# Patient Record
Sex: MALE | Race: BLACK OR AFRICAN AMERICAN | Employment: UNEMPLOYED | URBAN - METROPOLITAN AREA
[De-identification: names, ages, dates, MRNs, and addresses within clinical notes are randomized per-mention and may not be internally consistent; named-entity substitution may affect disease eponyms.]

---

## 2017-09-22 ENCOUNTER — APPOINTMENT (OUTPATIENT)
Dept: GENERAL RADIOLOGY | Age: 27
End: 2017-09-22
Attending: EMERGENCY MEDICINE
Payer: SELF-PAY

## 2017-09-22 ENCOUNTER — HOSPITAL ENCOUNTER (EMERGENCY)
Age: 27
Discharge: HOME | End: 2017-09-22
Attending: EMERGENCY MEDICINE
Payer: SELF-PAY

## 2017-09-22 VITALS
DIASTOLIC BLOOD PRESSURE: 64 MMHG | HEART RATE: 76 BPM | RESPIRATION RATE: 21 BRPM | BODY MASS INDEX: 44.1 KG/M2 | OXYGEN SATURATION: 94 % | SYSTOLIC BLOOD PRESSURE: 117 MMHG | WEIGHT: 315 LBS | TEMPERATURE: 97.8 F | HEIGHT: 71 IN

## 2017-09-22 DIAGNOSIS — R07.89 ATYPICAL CHEST PAIN: Primary | ICD-10-CM

## 2017-09-22 LAB
ALBUMIN SERPL-MCNC: 3.1 G/DL (ref 3.5–5)
ALBUMIN/GLOB SERPL: 0.7 {RATIO} (ref 1.1–2.2)
ALP SERPL-CCNC: 74 U/L (ref 45–117)
ALT SERPL-CCNC: 57 U/L (ref 12–78)
ANION GAP SERPL CALC-SCNC: 6 MMOL/L (ref 5–15)
AST SERPL-CCNC: 36 U/L (ref 15–37)
ATRIAL RATE: 75 BPM
BASOPHILS # BLD: 0 K/UL (ref 0–0.1)
BASOPHILS NFR BLD: 0 % (ref 0–1)
BILIRUB SERPL-MCNC: 0.1 MG/DL (ref 0.2–1)
BUN SERPL-MCNC: 13 MG/DL (ref 6–20)
BUN/CREAT SERPL: 15 (ref 12–20)
CALCIUM SERPL-MCNC: 8.3 MG/DL (ref 8.5–10.1)
CALCULATED P AXIS, ECG09: 21 DEGREES
CALCULATED R AXIS, ECG10: 50 DEGREES
CALCULATED T AXIS, ECG11: 34 DEGREES
CHLORIDE SERPL-SCNC: 104 MMOL/L (ref 97–108)
CK MB CFR SERPL CALC: 0.4 % (ref 0–2.5)
CK MB SERPL-MCNC: 4.2 NG/ML (ref 5–25)
CK SERPL-CCNC: 943 U/L (ref 39–308)
CO2 SERPL-SCNC: 27 MMOL/L (ref 21–32)
CREAT SERPL-MCNC: 0.84 MG/DL (ref 0.7–1.3)
DIAGNOSIS, 93000: NORMAL
EOSINOPHIL # BLD: 0.2 K/UL (ref 0–0.4)
EOSINOPHIL NFR BLD: 5 % (ref 0–7)
ERYTHROCYTE [DISTWIDTH] IN BLOOD BY AUTOMATED COUNT: 14 % (ref 11.5–14.5)
GLOBULIN SER CALC-MCNC: 4.6 G/DL (ref 2–4)
GLUCOSE SERPL-MCNC: 102 MG/DL (ref 65–100)
HCT VFR BLD AUTO: 38 % (ref 36.6–50.3)
HGB BLD-MCNC: 12.2 G/DL (ref 12.1–17)
LYMPHOCYTES # BLD: 1.5 K/UL (ref 0.8–3.5)
LYMPHOCYTES NFR BLD: 30 % (ref 12–49)
MCH RBC QN AUTO: 27.2 PG (ref 26–34)
MCHC RBC AUTO-ENTMCNC: 32.1 G/DL (ref 30–36.5)
MCV RBC AUTO: 84.8 FL (ref 80–99)
MONOCYTES # BLD: 0.6 K/UL (ref 0–1)
MONOCYTES NFR BLD: 11 % (ref 5–13)
NEUTS SEG # BLD: 2.8 K/UL (ref 1.8–8)
NEUTS SEG NFR BLD: 54 % (ref 32–75)
P-R INTERVAL, ECG05: 182 MS
PLATELET # BLD AUTO: 288 K/UL (ref 150–400)
POTASSIUM SERPL-SCNC: 3.7 MMOL/L (ref 3.5–5.1)
PROT SERPL-MCNC: 7.7 G/DL (ref 6.4–8.2)
Q-T INTERVAL, ECG07: 384 MS
QRS DURATION, ECG06: 84 MS
QTC CALCULATION (BEZET), ECG08: 428 MS
RBC # BLD AUTO: 4.48 M/UL (ref 4.1–5.7)
SODIUM SERPL-SCNC: 137 MMOL/L (ref 136–145)
TROPONIN I SERPL-MCNC: <0.04 NG/ML
VENTRICULAR RATE, ECG03: 75 BPM
WBC # BLD AUTO: 5.1 K/UL (ref 4.1–11.1)

## 2017-09-22 PROCEDURE — 80053 COMPREHEN METABOLIC PANEL: CPT | Performed by: EMERGENCY MEDICINE

## 2017-09-22 PROCEDURE — 93005 ELECTROCARDIOGRAM TRACING: CPT

## 2017-09-22 PROCEDURE — 71020 XR CHEST PA LAT: CPT

## 2017-09-22 PROCEDURE — 82553 CREATINE MB FRACTION: CPT | Performed by: EMERGENCY MEDICINE

## 2017-09-22 PROCEDURE — 99285 EMERGENCY DEPT VISIT HI MDM: CPT

## 2017-09-22 PROCEDURE — 36415 COLL VENOUS BLD VENIPUNCTURE: CPT | Performed by: EMERGENCY MEDICINE

## 2017-09-22 PROCEDURE — 82550 ASSAY OF CK (CPK): CPT | Performed by: EMERGENCY MEDICINE

## 2017-09-22 PROCEDURE — 85025 COMPLETE CBC W/AUTO DIFF WBC: CPT | Performed by: EMERGENCY MEDICINE

## 2017-09-22 PROCEDURE — 84484 ASSAY OF TROPONIN QUANT: CPT | Performed by: EMERGENCY MEDICINE

## 2017-09-22 NOTE — ED PROVIDER NOTES
HPI Comments: Ivon Dotson is a 28 y.o. male who presents ambulatory to the ED with c/o intermittent sharp CP that began at 4:00pm on 9/21/17 that made it difficult to sleep. He reports associated symptoms of SOB and pain in left arm. Pt states that the pain lasts for a few minutes and experienced some sweating; he has had 3 episodes since 4:00pm.  Pt has no hx of blood clots, recent surgery, or recent travel. He denies CP or SOB over the last several weeks. He denies N/V/D, abd pain, cough or hemoptysis. Pt has PShx to left arm and states he sometimes gets pain in this arm, so he is unsure if pain today was related to chest pain or due to prior surgery. He denies family hx of MI.    PCP: None    Social history significant for: - Tobacco, + (occasional) EtOH, + Illicit Drug Use (marijuana)    There are no other complaints, changes, or physical findings at this time. The history is provided by the patient. No  was used. History reviewed. No pertinent past medical history. History reviewed. No pertinent surgical history. History reviewed. No pertinent family history. Social History     Social History    Marital status: SINGLE     Spouse name: N/A    Number of children: N/A    Years of education: N/A     Occupational History    Not on file. Social History Main Topics    Smoking status: Never Smoker    Smokeless tobacco: Never Used    Alcohol use Yes      Comment: weekends    Drug use: Yes     Special: Marijuana    Sexual activity: Not on file     Other Topics Concern    Not on file     Social History Narrative    No narrative on file         ALLERGIES: Review of patient's allergies indicates no known allergies. Review of Systems   Constitutional: Positive for diaphoresis (mild). Negative for chills, fatigue and fever. HENT: Negative for congestion, rhinorrhea and sore throat. Eyes: Negative for pain, discharge and visual disturbance.    Respiratory: Positive for shortness of breath. Negative for cough, chest tightness and wheezing. Negative for hemoptysis   Cardiovascular: Positive for chest pain. Negative for palpitations and leg swelling. Gastrointestinal: Negative for abdominal pain, constipation, diarrhea, nausea and vomiting. Genitourinary: Negative for dysuria, frequency and hematuria. Musculoskeletal: Positive for myalgias (left arm). Negative for arthralgias and back pain. Skin: Negative for rash. Neurological: Negative for dizziness, weakness, light-headedness and headaches. Psychiatric/Behavioral: Negative. Patient Vitals for the past 12 hrs:   Temp Pulse Resp BP SpO2   09/22/17 0445 - 76 21 117/64 94 %   09/22/17 0400 - 83 20 126/68 97 %   09/22/17 0331 - 86 18 - 98 %   09/22/17 0330 - - - 134/61 -   09/22/17 0300 - 76 20 136/71 100 %   09/22/17 0245 - 73 16 131/75 99 %   09/22/17 0237 97.8 °F (36.6 °C) 76 16 129/70 97 %     Physical Exam   Constitutional: He is oriented to person, place, and time. He appears well-developed and well-nourished. No distress. Obese   HENT:   Head: Normocephalic and atraumatic. Eyes: EOM are normal. Right eye exhibits no discharge. Left eye exhibits no discharge. No scleral icterus. Neck: Normal range of motion. Neck supple. No tracheal deviation present. Cardiovascular: Normal rate, regular rhythm, normal heart sounds and intact distal pulses. Exam reveals no gallop and no friction rub. No murmur heard. Pulmonary/Chest: Effort normal and breath sounds normal. No respiratory distress. He has no wheezes. He has no rales. Abdominal: Soft. He exhibits no distension. There is no tenderness. Musculoskeletal: Normal range of motion. He exhibits no edema. Lymphadenopathy:     He has no cervical adenopathy. Neurological: He is alert and oriented to person, place, and time. Skin: Skin is warm and dry. No rash noted. Psychiatric: He has a normal mood and affect.    Nursing note and vitals reviewed. MDM  Number of Diagnoses or Management Options  Atypical chest pain:   Diagnosis management comments:     Differential includes atypical chest pain, stable angina, unstable angina, MI, pleurisy, costochondritis, pneumonia, bronchitis, MSK pain. Do not suspect dissection or PE (PERC negative). - CBC, CMP  - Roger, one set given duration of pain  - CXR         Amount and/or Complexity of Data Reviewed  Clinical lab tests: ordered and reviewed  Tests in the radiology section of CPT®: ordered and reviewed  Tests in the medicine section of CPT®: ordered and reviewed  Review and summarize past medical records: yes  Independent visualization of images, tracings, or specimens: yes    Patient Progress  Patient progress: stable    ED Course       Procedures     EKG interpretation: (Preliminary) 2:30 AM  Rhythm: normal sinus rhythm; and regular . Rate (approx.): 75; Axis: normal; IN interval: normal; QRS interval: normal ; ST/T wave: normal; Other findings: none. Written by Mortimer Pro, ED Scribe, as dictated by Lourdes Willis MD    Progress Note:  4:54 AM  Pt sleeping on reevaluation. He denies CP. Troponin negative. Heart score 1 so will discharge with cardiology follow up. Discussed diagnosis, follow up, return instructions, test results, x-rays and medications with the patient and/or family. The patient and/or family have been given the opportunity to ask questions. The patient and/or family express understanding of the plan of care, follow-up appointments and return instructions. The patient and/or family agree to follow up with cardiology as directed and to return immediately if the chest pain worsens.   The patient and family express understanding that although cardiac testing at this time is negative, a cardiac problem could still be present and that a follow-up appointment with a cardiologist for further evaluation and risk factor modification is necessary to complete the evaluation of this complaint. Provided customary return to ED instructions. Jayme Cochran MD    LABORATORY TESTS:  Recent Results (from the past 12 hour(s))   EKG, 12 LEAD, INITIAL    Collection Time: 09/22/17  2:30 AM   Result Value Ref Range    Ventricular Rate 75 BPM    Atrial Rate 75 BPM    P-R Interval 182 ms    QRS Duration 84 ms    Q-T Interval 384 ms    QTC Calculation (Bezet) 428 ms    Calculated P Axis 21 degrees    Calculated R Axis 50 degrees    Calculated T Axis 34 degrees    Diagnosis       Normal sinus rhythm  Normal ECG  No previous ECGs available     CBC WITH AUTOMATED DIFF    Collection Time: 09/22/17  3:04 AM   Result Value Ref Range    WBC 5.1 4.1 - 11.1 K/uL    RBC 4.48 4.10 - 5.70 M/uL    HGB 12.2 12.1 - 17.0 g/dL    HCT 38.0 36.6 - 50.3 %    MCV 84.8 80.0 - 99.0 FL    MCH 27.2 26.0 - 34.0 PG    MCHC 32.1 30.0 - 36.5 g/dL    RDW 14.0 11.5 - 14.5 %    PLATELET 280 784 - 807 K/uL    NEUTROPHILS 54 32 - 75 %    LYMPHOCYTES 30 12 - 49 %    MONOCYTES 11 5 - 13 %    EOSINOPHILS 5 0 - 7 %    BASOPHILS 0 0 - 1 %    ABS. NEUTROPHILS 2.8 1.8 - 8.0 K/UL    ABS. LYMPHOCYTES 1.5 0.8 - 3.5 K/UL    ABS. MONOCYTES 0.6 0.0 - 1.0 K/UL    ABS. EOSINOPHILS 0.2 0.0 - 0.4 K/UL    ABS. BASOPHILS 0.0 0.0 - 0.1 K/UL   METABOLIC PANEL, COMPREHENSIVE    Collection Time: 09/22/17  3:04 AM   Result Value Ref Range    Sodium 137 136 - 145 mmol/L    Potassium 3.7 3.5 - 5.1 mmol/L    Chloride 104 97 - 108 mmol/L    CO2 27 21 - 32 mmol/L    Anion gap 6 5 - 15 mmol/L    Glucose 102 (H) 65 - 100 mg/dL    BUN 13 6 - 20 MG/DL    Creatinine 0.84 0.70 - 1.30 MG/DL    BUN/Creatinine ratio 15 12 - 20      GFR est AA >60 >60 ml/min/1.73m2    GFR est non-AA >60 >60 ml/min/1.73m2    Calcium 8.3 (L) 8.5 - 10.1 MG/DL    Bilirubin, total 0.1 (L) 0.2 - 1.0 MG/DL    ALT (SGPT) 57 12 - 78 U/L    AST (SGOT) 36 15 - 37 U/L    Alk.  phosphatase 74 45 - 117 U/L    Protein, total 7.7 6.4 - 8.2 g/dL    Albumin 3.1 (L) 3.5 - 5.0 g/dL Globulin 4.6 (H) 2.0 - 4.0 g/dL    A-G Ratio 0.7 (L) 1.1 - 2.2     CK W/ REFLX CKMB    Collection Time: 09/22/17  3:04 AM   Result Value Ref Range     (H) 39 - 308 U/L   TROPONIN I    Collection Time: 09/22/17  3:04 AM   Result Value Ref Range    Troponin-I, Qt. <0.04 <0.05 ng/mL   CK-MB,QUANT. Collection Time: 09/22/17  3:04 AM   Result Value Ref Range    CK - MB 4.2 (H) <3.6 NG/ML    CK-MB Index 0.4 0 - 2.5         IMAGING RESULTS:  Study Result      INDICATION: Chest pain and shortness of breath     FINDINGS: PA and lateral views of the chest demonstrate a normal  cardiomediastinal silhouette and clear lungs bilaterally. The visualized osseous  structures are unremarkable.     IMPRESSION  IMPRESSION: No acute process. MEDICATIONS GIVEN:  Medications - No data to display    IMPRESSION:  1. Atypical chest pain        PLAN:  1. There are no discharge medications for this patient. 2.   Follow-up Information     Follow up With Details Comments [address], [place] In 3 days Please follow up with your primary care provider and cardiology [address] [facility]  [address] [address] [phone]      [facility] EMERGENCY DEPT  As needed, If symptoms worsen [address] [place] [address]  [phone]        Return to ED if worse     Discharge Note:  4:49 AM  The pt is ready for discharge. The pt's signs, symptoms, diagnosis, and discharge instructions have been discussed and pt has conveyed their understanding. The pt is to follow up as recommended or return to ER should their symptoms worsen. Plan has been discussed and pt is in agreement. This note is prepared by Jacques Anderson, acting as a Scribe for Nancy Nj MD.    Nancy Nj MD: The scribe's documentation has been prepared under my direction and personally reviewed by me in its entirety.  I confirm that the notes above accurately reflects all work, treatment, procedures, and medical decision making performed by me.

## 2017-09-22 NOTE — ED NOTES
Pt to ED for chest pain which pt states started while he was walking around and talking around 1600 yesterday, pt denies N/V/D, fever, SOB, pt denies any pertinent medical hx, but reports smoking marijuana, pt placed on cardiac monitoring x2, call bell within reach, visitor at bedside.

## 2017-09-22 NOTE — DISCHARGE INSTRUCTIONS
Chest Pain: Care Instructions  Your Care Instructions  There are many things that can cause chest pain. Some are not serious and will get better on their own in a few days. But some kinds of chest pain need more testing and treatment. Your doctor may have recommended a follow-up visit in the next 8 to 12 hours. If you are not getting better, you may need more tests or treatment. Even though your doctor has released you, you still need to watch for any problems. The doctor carefully checked you, but sometimes problems can develop later. If you have new symptoms or if your symptoms do not get better, get medical care right away. If you have worse or different chest pain or pressure that lasts more than 5 minutes or you passed out (lost consciousness), call 911 or seek other emergency help right away. A medical visit is only one step in your treatment. Even if you feel better, you still need to do what your doctor recommends, such as going to all suggested follow-up appointments and taking medicines exactly as directed. This will help you recover and help prevent future problems. How can you care for yourself at home? · Rest until you feel better. · Take your medicine exactly as prescribed. Call your doctor if you think you are having a problem with your medicine. · Do not drive after taking a prescription pain medicine. When should you call for help? Call 911 if:  · You passed out (lost consciousness). · You have severe difficulty breathing. · You have symptoms of a heart attack. These may include:  ¨ Chest pain or pressure, or a strange feeling in your chest.  ¨ Sweating. ¨ Shortness of breath. ¨ Nausea or vomiting. ¨ Pain, pressure, or a strange feeling in your back, neck, jaw, or upper belly or in one or both shoulders or arms. ¨ Lightheadedness or sudden weakness. ¨ A fast or irregular heartbeat.   After you call 911, the  may tell you to chew 1 adult-strength or 2 to 4 low-dose aspirin. Wait for an ambulance. Do not try to drive yourself. Call your doctor today if:  · You have any trouble breathing. · Your chest pain gets worse. · You are dizzy or lightheaded, or you feel like you may faint. · You are not getting better as expected. · You are having new or different chest pain. Where can you learn more? Go to http://lucas-max.info/. Enter A120 in the search box to learn more about \"Chest Pain: Care Instructions. \"  Current as of: March 20, 2017  Content Version: 11.3  © 7286-2294 [employer]. Care instructions adapted under license by [employer] (which disclaims liability or warranty for this information). If you have questions about a medical condition or this instruction, always ask your healthcare professional. [address] any warranty or liability for your use of this information.

## 2021-11-24 ENCOUNTER — APPOINTMENT (OUTPATIENT)
Dept: GENERAL RADIOLOGY | Age: 31
End: 2021-11-24
Attending: EMERGENCY MEDICINE

## 2021-11-24 ENCOUNTER — HOSPITAL ENCOUNTER (EMERGENCY)
Age: 31
Discharge: HOME | End: 2021-11-25
Attending: EMERGENCY MEDICINE

## 2021-11-24 DIAGNOSIS — R06.00 DYSPNEA, UNSPECIFIED TYPE: Primary | ICD-10-CM

## 2021-11-24 LAB
ALBUMIN SERPL-MCNC: 3.2 G/DL (ref 3.5–5)
ALBUMIN/GLOB SERPL: 0.7 {RATIO} (ref 1.1–2.2)
ALP SERPL-CCNC: 91 U/L (ref 45–117)
ALT SERPL-CCNC: 74 U/L (ref 12–78)
ANION GAP SERPL CALC-SCNC: 7 MMOL/L (ref 5–15)
AST SERPL-CCNC: 36 U/L (ref 15–37)
BASOPHILS # BLD: 0.1 K/UL (ref 0–0.1)
BASOPHILS NFR BLD: 1 % (ref 0–1)
BILIRUB SERPL-MCNC: 0.2 MG/DL (ref 0.2–1)
BNP SERPL-MCNC: 52 PG/ML
BUN SERPL-MCNC: 19 MG/DL (ref 6–20)
BUN/CREAT SERPL: 18 (ref 12–20)
CALCIUM SERPL-MCNC: 8.7 MG/DL (ref 8.5–10.1)
CHLORIDE SERPL-SCNC: 105 MMOL/L (ref 97–108)
CK MB CFR SERPL CALC: 0.6 % (ref 0–2.5)
CK MB SERPL-MCNC: 2.3 NG/ML (ref 5–25)
CK SERPL-CCNC: 412 U/L (ref 39–308)
CO2 SERPL-SCNC: 25 MMOL/L (ref 21–32)
CREAT SERPL-MCNC: 1.03 MG/DL (ref 0.7–1.3)
DIFFERENTIAL METHOD BLD: ABNORMAL
EOSINOPHIL # BLD: 0.1 K/UL (ref 0–0.4)
EOSINOPHIL NFR BLD: 2 % (ref 0–7)
ERYTHROCYTE [DISTWIDTH] IN BLOOD BY AUTOMATED COUNT: 14.6 % (ref 11.5–14.5)
GLOBULIN SER CALC-MCNC: 4.4 G/DL (ref 2–4)
GLUCOSE SERPL-MCNC: 113 MG/DL (ref 65–100)
HCT VFR BLD AUTO: 40.7 % (ref 36.6–50.3)
HGB BLD-MCNC: 13 G/DL (ref 12.1–17)
IMM GRANULOCYTES # BLD AUTO: 0 K/UL (ref 0–0.04)
IMM GRANULOCYTES NFR BLD AUTO: 0 % (ref 0–0.5)
LYMPHOCYTES # BLD: 2 K/UL (ref 0.8–3.5)
LYMPHOCYTES NFR BLD: 30 % (ref 12–49)
MCH RBC QN AUTO: 27.3 PG (ref 26–34)
MCHC RBC AUTO-ENTMCNC: 31.9 G/DL (ref 30–36.5)
MCV RBC AUTO: 85.5 FL (ref 80–99)
MONOCYTES # BLD: 0.6 K/UL (ref 0–1)
MONOCYTES NFR BLD: 9 % (ref 5–13)
NEUTS SEG # BLD: 3.8 K/UL (ref 1.8–8)
NEUTS SEG NFR BLD: 58 % (ref 32–75)
NRBC # BLD: 0 K/UL (ref 0–0.01)
NRBC BLD-RTO: 0 PER 100 WBC
PLATELET # BLD AUTO: 251 K/UL (ref 150–400)
PMV BLD AUTO: 8.6 FL (ref 8.9–12.9)
POTASSIUM SERPL-SCNC: 3.5 MMOL/L (ref 3.5–5.1)
PROT SERPL-MCNC: 7.6 G/DL (ref 6.4–8.2)
RBC # BLD AUTO: 4.76 M/UL (ref 4.1–5.7)
SODIUM SERPL-SCNC: 137 MMOL/L (ref 136–145)
TROPONIN-HIGH SENSITIVITY: 9 NG/L (ref 0–76)
WBC # BLD AUTO: 6.5 K/UL (ref 4.1–11.1)

## 2021-11-24 PROCEDURE — 83880 ASSAY OF NATRIURETIC PEPTIDE: CPT

## 2021-11-24 PROCEDURE — 80053 COMPREHEN METABOLIC PANEL: CPT

## 2021-11-24 PROCEDURE — 36415 COLL VENOUS BLD VENIPUNCTURE: CPT

## 2021-11-24 PROCEDURE — 93005 ELECTROCARDIOGRAM TRACING: CPT

## 2021-11-24 PROCEDURE — 84484 ASSAY OF TROPONIN QUANT: CPT

## 2021-11-24 PROCEDURE — 99284 EMERGENCY DEPT VISIT MOD MDM: CPT

## 2021-11-24 PROCEDURE — 85379 FIBRIN DEGRADATION QUANT: CPT

## 2021-11-24 PROCEDURE — 82550 ASSAY OF CK (CPK): CPT

## 2021-11-24 PROCEDURE — 71046 X-RAY EXAM CHEST 2 VIEWS: CPT

## 2021-11-24 PROCEDURE — 82553 CREATINE MB FRACTION: CPT

## 2021-11-24 PROCEDURE — 85025 COMPLETE CBC W/AUTO DIFF WBC: CPT

## 2021-11-25 ENCOUNTER — APPOINTMENT (OUTPATIENT)
Dept: CT IMAGING | Age: 31
End: 2021-11-25
Attending: EMERGENCY MEDICINE

## 2021-11-25 VITALS
TEMPERATURE: 98.2 F | BODY MASS INDEX: 44.1 KG/M2 | SYSTOLIC BLOOD PRESSURE: 130 MMHG | HEART RATE: 70 BPM | WEIGHT: 315 LBS | OXYGEN SATURATION: 99 % | HEIGHT: 71 IN | DIASTOLIC BLOOD PRESSURE: 81 MMHG | RESPIRATION RATE: 14 BRPM

## 2021-11-25 LAB
ATRIAL RATE: 73 BPM
CALCULATED P AXIS, ECG09: 57 DEGREES
CALCULATED R AXIS, ECG10: 56 DEGREES
CALCULATED T AXIS, ECG11: 56 DEGREES
D DIMER PPP FEU-MCNC: 0.99 MG/L FEU (ref 0–0.65)
DIAGNOSIS, 93000: NORMAL
P-R INTERVAL, ECG05: 180 MS
Q-T INTERVAL, ECG07: 396 MS
QRS DURATION, ECG06: 88 MS
QTC CALCULATION (BEZET), ECG08: 436 MS
VENTRICULAR RATE, ECG03: 73 BPM

## 2021-11-25 PROCEDURE — U0005 INFEC AGEN DETEC AMPLI PROBE: HCPCS

## 2021-11-25 PROCEDURE — 74011000636 HC RX REV CODE- 636: Performed by: EMERGENCY MEDICINE

## 2021-11-25 PROCEDURE — 71275 CT ANGIOGRAPHY CHEST: CPT

## 2021-11-25 RX ORDER — ALBUTEROL SULFATE 90 UG/1
2 AEROSOL, METERED RESPIRATORY (INHALATION)
Qty: 1 EACH | Refills: 0 | Status: SHIPPED | OUTPATIENT
Start: 2021-11-25

## 2021-11-25 RX ADMIN — IOPAMIDOL 100 ML: 755 INJECTION, SOLUTION INTRAVENOUS at 01:10

## 2021-11-25 NOTE — ED PROVIDER NOTES
EMERGENCY DEPARTMENT HISTORY AND PHYSICAL EXAM      Date: 11/24/2021  Patient Name: Alexandre Howell. History of Presenting Illness     Chief Complaint   Patient presents with    Shortness of Breath     pt presents w/ c/o increased SOB. pt reports being seen at [place] and was told he had fluid in his lungs, pt was started on laxis. History Provided By: Patient    HPI: Alexandre Howell., 27 y.o. male with PMHx as noted below presents the emergency department chief complaint of shortness of breath. Patient reports that he developed symptoms approximately 2 to 3 days ago. He reports some mild dyspnea that is worse with exertion. Also reports having intermittent chest pain 2 days ago however notes this has since resolved. Patient reports symptoms are relatively mild. States that he was seen at [facility] emergency department yesterday and told that he had fluid in his lungs and started on Lasix however notes that he is still had dyspnea today prompting him to come to our emergency department for additional evaluation. Denying any chest pain today and also is not experiencing any systemic symptoms. Pt denies any other alleviating or exacerbating factors. Additionally, pt specifically denies any recent fever, chills, headache, nausea, vomiting, abdominal pain,  lightheadedness, dizziness, numbness, weakness, BLE swelling, heart palpitations, urinary sxs, diarrhea, constipation, melena, hematochezia, cough, or congestion. PCP: None        Past History     Past Medical History:  History reviewed, no pertinent past medical history  Past Surgical History:  No past surgical history on file. Family History:  No family history on file.     Social History:  Social History     Tobacco Use    Smoking status: Never Smoker    Smokeless tobacco: Never Used   Substance Use Topics    Alcohol use: Yes     Comment: weekends    Drug use: Yes     Types: Marijuana       Allergies:  No Known Allergies      Review of Systems   Review of Systems  Constitutional: Negative for fever, chills, and fatigue. HENT: Negative for congestion, sore throat, rhinorrhea, sneezing and neck stiffness   Eyes: Negative for discharge and redness. Respiratory: Positive for  shortness of breath. Negative wheezing   Cardiovascular: Positive for chest pain. Negative palpitations   Gastrointestinal: Negative for nausea, vomiting, abdominal pain, constipation, diarrhea and blood in stool. Genitourinary: Negative for dysuria, hematuria, flank pain, decreased urine volume, discharge,   Musculoskeletal: Negative for myalgias or joint pain . Skin: Negative for rash or lesions . Neurological: Negative weakness, light-headedness, numbness and headaches. Physical Exam   Physical Exam    GENERAL: alert and oriented, no acute distress  EYES: PEERL, No injection, discharge or icterus. ENT: Mucous membranes pink and moist.  NECK: Supple  LUNGS: Airway patent. Non-labored respirations. Breath sounds clear with good air entry bilaterally. HEART: Regular rate and rhythm. No peripheral edema  ABDOMEN: Non-distended and non-tender, without guarding or rebound. SKIN:  warm, dry  MSK/EXTREMITIES: Without swelling, tenderness or deformity, symmetric with normal ROM  NEUROLOGICAL: Alert, oriented      Diagnostic Study Results     Labs -       Reviewed and interpreted by me    Radiologic Studies -   CTA CHEST W OR W WO CONT   Final Result   Bibasal atelectasis, no pulmonary emboli. XR CHEST PA LAT   Final Result   No acute changes. CT Results  (Last 48 hours)               11/25/21 0110  CTA CHEST W OR W WO CONT Final result    Impression:  Bibasal atelectasis, no pulmonary emboli. Narrative:  Clinical indication: Shortness of breath and chest pain. Localizer obtained without contrast at the left ovary arteries. Fast injection   rate of 100 cc of Isovue with review of the raw data and MIP reconstructions. No   prior.  CT dose reduction was achieved through the use of a standardized protocol   tailored for this examination and automatic exposure control for dose modulation   . Young Jimmyalicia There is no evidence for pulmonary emboli. Prominent nodes seen in the   mediastinum and leandro. There is no pericardial pleural effusions. There is no parenchymal mass or significant infiltrate. Bibasal atelectasis. CXR Results  (Last 48 hours)               11/24/21 2123  XR CHEST PA LAT Final result    Impression:  No acute changes. Narrative:  Clinical indication: Shortness of breath. Frontal and lateral views of the chest obtained, comparison September 22, 2017. The  heart size is normal. There is no acute infiltrate. Medical Decision Making     I, Stephanie Joshua MD am the first provider for this patient and am the attending of record for this patient encounter. I reviewed the vital signs, available nursing notes, past medical history, past surgical history, family history and social history. Vital Signs-Reviewed the patient's vital signs. No data found. Pulse Oximetry Analysis - 100% on RA    EKG interpretation: (Preliminary)  Rhythm: normal sinus rhythm; and regular . Rate (approx.): 73; Axis: normal; P wave: normal; QRS interval: normal ; ST/T wave: normal;  was interpreted by Keturah Jackson MD,ED Provider. Records Reviewed: Nursing Notes and Old Medical Records    Provider Notes (Medical Decision Making): On presentation the patient is well appearing, in no acute distress with reassurnig vital signs. Based on the history and exam the differential diagnosis for this patient includes  STEMI, NSTEMI, Angina, PE, Aortic Pathology, Chest Wall Pain, Pleurisy, Pneumonia, GERD/esophagitis, Anxiety, CHF, COVID-19. I have re-examined the patient and the patient denies any new or changing symptoms.   The patient has had a negative high-sensitivity troponin at this time and an EKG without any signs of acute ischemia. He  otherwise would be low risk per HEART score. Basic labs obtained were nondiagnostic and reassuring with exception of elevated D-dimer. CTA of the chest was obtained which was negative for pulmonary embolism or any other acute findings. Labs and imaging are not suggestive of heart failure. Patient is in no respiratory distress, no signs of increased work of breathing. Otherwise patient is remained stable and felt appropriate for further management as an outpatient. I did offer COVID-19 testing. The diagnosis, follow up, return instructions, test esults, x-rays and medications have been discussed and reviewed with the patient. The patient has been given the opportunity to ask questions. The patient expresses understanding of the diagnosis, follow-up and return instructions. The patient agrees to follow up with cardiology as directed and to return immediately if the chest pain worsens. The patient expresses understanding that although cardiac testing at this time is negative, a cardiac problem could still be present and that a follow-up appointment for further evaluation and risk factor modification is necessary to complete the evaluation of this complaint. Dany Diaz MD.      ED Course:   Initial assessment performed. The patients presenting problems have been discussed, and they are in agreement with the care plan formulated and outlined with them. I have encouraged them to ask questions as they arise throughout their visit. Medications   iopamidoL (ISOVUE-370) 76 % injection 100 mL (100 mL IntraVENous Given 11/25/21 0110)         NIKI Rodas Jr.'s  results have been reviewed with him. He has been counseled regarding his diagnosis. He verbally conveys understanding and agreement of the signs, symptoms, diagnosis, treatment and prognosis and additionally agrees to follow up as recommended.   He also agrees with the care-plan and conveys that all of his questions have been answered. I have also put together some discharge instructions for him that include: 1) educational information regarding their diagnosis, 2) how to care for their diagnosis at home, as well a 3) list of reasons why they would want to return to the ED prior to their follow-up appointment, should their condition change. Disposition:  home    PLAN:  1. Discharge Medication List as of 11/25/2021  1:43 AM        2. Follow-up Information     Follow up With Specialties Details Why Contact Info    \[facility]\"" EMERGENCY DEPT Emergency Medicine  If symptoms worsen [address]  [facility] [address]   [address] [address]  [address] Internal Medicine Schedule an appointment as soon as possible for a visit in 2 days  [address]  [phone]        Return to ED if worse     Diagnosis     Clinical Impression:   1. Dyspnea, unspecified type        Please note that this dictation was completed with Dragon, computer voice recognition software. Quite often unanticipated grammatical, syntax, homophones, and other interpretive errors are inadvertently transcribed by the computer software. Please disregard these errors. Additionally, please excuse any errors that have escaped final proofreading.

## 2021-11-26 LAB
SARS-COV-2, XPLCVT: NOT DETECTED
SOURCE, COVRS: NORMAL

## 2022-02-16 ENCOUNTER — HOSPITAL ENCOUNTER (EMERGENCY)
Age: 32
Discharge: ANOTHER HOSPITAL | End: 2022-02-17
Attending: EMERGENCY MEDICINE

## 2022-02-16 ENCOUNTER — APPOINTMENT (OUTPATIENT)
Dept: CT IMAGING | Age: 32
End: 2022-02-16
Attending: EMERGENCY MEDICINE

## 2022-02-16 DIAGNOSIS — R20.2 LEFT LEG PARESTHESIAS: ICD-10-CM

## 2022-02-16 DIAGNOSIS — I65.01 VERTEBRAL ARTERY OCCLUSION, RIGHT: Primary | ICD-10-CM

## 2022-02-16 DIAGNOSIS — R20.2 LEFT HAND PARESTHESIA: ICD-10-CM

## 2022-02-16 LAB
ALBUMIN SERPL-MCNC: 3.2 G/DL (ref 3.5–5)
ALBUMIN/GLOB SERPL: 0.7 {RATIO} (ref 1.1–2.2)
ALP SERPL-CCNC: 97 U/L (ref 45–117)
ALT SERPL-CCNC: 73 U/L (ref 12–78)
ANION GAP SERPL CALC-SCNC: 4 MMOL/L (ref 5–15)
AST SERPL-CCNC: 45 U/L (ref 15–37)
BASOPHILS # BLD: 0 K/UL (ref 0–0.1)
BASOPHILS NFR BLD: 1 % (ref 0–1)
BILIRUB SERPL-MCNC: 0.4 MG/DL (ref 0.2–1)
BUN SERPL-MCNC: 17 MG/DL (ref 6–20)
BUN/CREAT SERPL: 16 (ref 12–20)
CALCIUM SERPL-MCNC: 9.2 MG/DL (ref 8.5–10.1)
CHLORIDE SERPL-SCNC: 105 MMOL/L (ref 97–108)
CO2 SERPL-SCNC: 29 MMOL/L (ref 21–32)
CREAT SERPL-MCNC: 1.09 MG/DL (ref 0.7–1.3)
DIFFERENTIAL METHOD BLD: ABNORMAL
EOSINOPHIL # BLD: 0.2 K/UL (ref 0–0.4)
EOSINOPHIL NFR BLD: 4 % (ref 0–7)
ERYTHROCYTE [DISTWIDTH] IN BLOOD BY AUTOMATED COUNT: 13.7 % (ref 11.5–14.5)
GLOBULIN SER CALC-MCNC: 4.7 G/DL (ref 2–4)
GLUCOSE SERPL-MCNC: 84 MG/DL (ref 65–100)
HCT VFR BLD AUTO: 39.4 % (ref 36.6–50.3)
HGB BLD-MCNC: 12.5 G/DL (ref 12.1–17)
IMM GRANULOCYTES # BLD AUTO: 0 K/UL (ref 0–0.04)
IMM GRANULOCYTES NFR BLD AUTO: 0 % (ref 0–0.5)
LYMPHOCYTES # BLD: 1.4 K/UL (ref 0.8–3.5)
LYMPHOCYTES NFR BLD: 27 % (ref 12–49)
MCH RBC QN AUTO: 27.6 PG (ref 26–34)
MCHC RBC AUTO-ENTMCNC: 31.7 G/DL (ref 30–36.5)
MCV RBC AUTO: 87 FL (ref 80–99)
MONOCYTES # BLD: 0.5 K/UL (ref 0–1)
MONOCYTES NFR BLD: 9 % (ref 5–13)
NEUTS SEG # BLD: 3.1 K/UL (ref 1.8–8)
NEUTS SEG NFR BLD: 59 % (ref 32–75)
NRBC # BLD: 0 K/UL (ref 0–0.01)
NRBC BLD-RTO: 0 PER 100 WBC
PLATELET # BLD AUTO: 263 K/UL (ref 150–400)
PMV BLD AUTO: 8.7 FL (ref 8.9–12.9)
POTASSIUM SERPL-SCNC: 4 MMOL/L (ref 3.5–5.1)
PROT SERPL-MCNC: 7.9 G/DL (ref 6.4–8.2)
RBC # BLD AUTO: 4.53 M/UL (ref 4.1–5.7)
SODIUM SERPL-SCNC: 138 MMOL/L (ref 136–145)
WBC # BLD AUTO: 5.1 K/UL (ref 4.1–11.1)

## 2022-02-16 PROCEDURE — 70496 CT ANGIOGRAPHY HEAD: CPT

## 2022-02-16 PROCEDURE — 85025 COMPLETE CBC W/AUTO DIFF WBC: CPT

## 2022-02-16 PROCEDURE — 80053 COMPREHEN METABOLIC PANEL: CPT

## 2022-02-16 PROCEDURE — 36415 COLL VENOUS BLD VENIPUNCTURE: CPT

## 2022-02-16 PROCEDURE — 99285 EMERGENCY DEPT VISIT HI MDM: CPT

## 2022-02-16 PROCEDURE — 74011000636 HC RX REV CODE- 636: Performed by: EMERGENCY MEDICINE

## 2022-02-16 PROCEDURE — 70450 CT HEAD/BRAIN W/O DYE: CPT

## 2022-02-16 RX ADMIN — IOPAMIDOL 100 ML: 755 INJECTION, SOLUTION INTRAVENOUS at 22:55

## 2022-02-17 ENCOUNTER — HOSPITAL ENCOUNTER (INPATIENT)
Age: 32
LOS: 1 days | Discharge: HOME | DRG: 069 | End: 2022-02-18
Attending: EMERGENCY MEDICINE | Admitting: HOSPITALIST
Payer: COMMERCIAL

## 2022-02-17 ENCOUNTER — APPOINTMENT (OUTPATIENT)
Dept: MRI IMAGING | Age: 32
DRG: 069 | End: 2022-02-17
Attending: HOSPITALIST

## 2022-02-17 VITALS
BODY MASS INDEX: 44.1 KG/M2 | TEMPERATURE: 98.1 F | RESPIRATION RATE: 16 BRPM | SYSTOLIC BLOOD PRESSURE: 137 MMHG | HEIGHT: 71 IN | HEART RATE: 63 BPM | WEIGHT: 315 LBS | DIASTOLIC BLOOD PRESSURE: 76 MMHG | OXYGEN SATURATION: 97 %

## 2022-02-17 DIAGNOSIS — I65.01 OCCLUSION OF RIGHT VERTEBRAL ARTERY: Primary | ICD-10-CM

## 2022-02-17 PROBLEM — G45.9 TIA (TRANSIENT ISCHEMIC ATTACK): Status: ACTIVE | Noted: 2022-02-17

## 2022-02-17 LAB
APTT PPP: 26 SEC (ref 22.1–31)
ASPIRIN TEST, ASPIRN: 513 ARU
CHOLEST SERPL-MCNC: 228 MG/DL
EST. AVERAGE GLUCOSE BLD GHB EST-MCNC: 108 MG/DL
HBA1C MFR BLD: 5.4 % (ref 4–5.6)
HDLC SERPL-MCNC: 54 MG/DL
HDLC SERPL: 4.2 {RATIO} (ref 0–5)
INR PPP: 1 (ref 0.9–1.1)
LDLC SERPL CALC-MCNC: 137.2 MG/DL (ref 0–100)
P2Y12 PLT RESPONSE,PPPR: 135 PRU (ref 194–418)
P2Y12 PLT RESPONSE,PPPR: 254 PRU (ref 194–418)
PROTHROMBIN TIME: 10.3 SEC (ref 9–11.1)
THERAPEUTIC RANGE,PTTT: NORMAL SECS (ref 58–77)
TRIGL SERPL-MCNC: 184 MG/DL (ref ?–150)
VLDLC SERPL CALC-MCNC: 36.8 MG/DL

## 2022-02-17 PROCEDURE — 99222 1ST HOSP IP/OBS MODERATE 55: CPT | Performed by: PSYCHIATRY & NEUROLOGY

## 2022-02-17 PROCEDURE — 36415 COLL VENOUS BLD VENIPUNCTURE: CPT

## 2022-02-17 PROCEDURE — 85576 BLOOD PLATELET AGGREGATION: CPT

## 2022-02-17 PROCEDURE — 85610 PROTHROMBIN TIME: CPT

## 2022-02-17 PROCEDURE — 85730 THROMBOPLASTIN TIME PARTIAL: CPT

## 2022-02-17 PROCEDURE — 99285 EMERGENCY DEPT VISIT HI MDM: CPT

## 2022-02-17 PROCEDURE — 83036 HEMOGLOBIN GLYCOSYLATED A1C: CPT

## 2022-02-17 PROCEDURE — APPNB45 APP NON BILLABLE 31-45 MINUTES: Performed by: NURSE PRACTITIONER

## 2022-02-17 PROCEDURE — 65660000000 HC RM CCU STEPDOWN

## 2022-02-17 PROCEDURE — 74011250637 HC RX REV CODE- 250/637: Performed by: EMERGENCY MEDICINE

## 2022-02-17 PROCEDURE — 70551 MRI BRAIN STEM W/O DYE: CPT

## 2022-02-17 PROCEDURE — 74011250637 HC RX REV CODE- 250/637: Performed by: NURSE PRACTITIONER

## 2022-02-17 PROCEDURE — 74011250637 HC RX REV CODE- 250/637: Performed by: HOSPITALIST

## 2022-02-17 PROCEDURE — 74011250636 HC RX REV CODE- 250/636: Performed by: HOSPITALIST

## 2022-02-17 PROCEDURE — 80061 LIPID PANEL: CPT

## 2022-02-17 RX ORDER — CLOPIDOGREL BISULFATE 75 MG/1
300 TABLET ORAL
Status: COMPLETED | OUTPATIENT
Start: 2022-02-17 | End: 2022-02-17

## 2022-02-17 RX ORDER — ACETAMINOPHEN 650 MG/1
650 SUPPOSITORY RECTAL
Status: DISCONTINUED | OUTPATIENT
Start: 2022-02-17 | End: 2022-02-18 | Stop reason: HOSPADM

## 2022-02-17 RX ORDER — AMLODIPINE BESYLATE 5 MG/1
5 TABLET ORAL DAILY
Status: DISCONTINUED | OUTPATIENT
Start: 2022-02-17 | End: 2022-02-18 | Stop reason: HOSPADM

## 2022-02-17 RX ORDER — ATORVASTATIN CALCIUM 40 MG/1
80 TABLET, FILM COATED ORAL DAILY
Status: DISCONTINUED | OUTPATIENT
Start: 2022-02-18 | End: 2022-02-18 | Stop reason: HOSPADM

## 2022-02-17 RX ORDER — SODIUM CHLORIDE 9 MG/ML
75 INJECTION, SOLUTION INTRAVENOUS CONTINUOUS
Status: DISPENSED | OUTPATIENT
Start: 2022-02-17 | End: 2022-02-18

## 2022-02-17 RX ORDER — ATORVASTATIN CALCIUM 40 MG/1
40 TABLET, FILM COATED ORAL DAILY
Status: DISCONTINUED | OUTPATIENT
Start: 2022-02-18 | End: 2022-02-17

## 2022-02-17 RX ORDER — ACETAMINOPHEN 325 MG/1
650 TABLET ORAL
Status: DISCONTINUED | OUTPATIENT
Start: 2022-02-17 | End: 2022-02-18 | Stop reason: HOSPADM

## 2022-02-17 RX ORDER — GUAIFENESIN 100 MG/5ML
81 LIQUID (ML) ORAL DAILY
Status: DISCONTINUED | OUTPATIENT
Start: 2022-02-18 | End: 2022-02-18 | Stop reason: HOSPADM

## 2022-02-17 RX ORDER — GUAIFENESIN 100 MG/5ML
325 LIQUID (ML) ORAL
Status: COMPLETED | OUTPATIENT
Start: 2022-02-17 | End: 2022-02-17

## 2022-02-17 RX ADMIN — CLOPIDOGREL BISULFATE 300 MG: 75 TABLET ORAL at 00:15

## 2022-02-17 RX ADMIN — SODIUM CHLORIDE 75 ML/HR: 9 INJECTION, SOLUTION INTRAVENOUS at 09:42

## 2022-02-17 RX ADMIN — TICAGRELOR 60 MG: 60 TABLET ORAL at 14:00

## 2022-02-17 RX ADMIN — AMLODIPINE BESYLATE 5 MG: 5 TABLET ORAL at 15:20

## 2022-02-17 RX ADMIN — ASPIRIN 324 MG: 81 TABLET, CHEWABLE ORAL at 00:15

## 2022-02-17 NOTE — PROGRESS NOTES
Physical Therapy  2/17/2022    Order received, chart reviewed. Patient with vertebral artery occlusion and potential dissection. Transferred to [facility] for NIS services. Has not been seen by NIS to make POC therefore will hold PT evaluation and f/u when appropriate. Thank you.     Arlene Rodriguez, PT, DPT

## 2022-02-17 NOTE — CONSULTS
Neuro update:    MRI reviewed which is nonacute. No stroke. He tells me he feels his baseline now. Suspect post circ TIA. Reasonable to stay on dual antiplatelets per NIS. TIA/stroke work-up with echo. I will follow peripherally. Please call if needed.     [address] [facility],   Neurologist  Diplomate, American Board of Psychiatry and Neurology  Board Certified, Adult Neurology and Brain Injury Medicine

## 2022-02-17 NOTE — PROGRESS NOTES
Neurocritical Care Brief Progress Note:    Labs reviewed:  ARU: 513, subtherapeutic  PRU: 254, subtherapeutic    MRI brain reviewed, negative for CVA. Given subtherapeutic levels after 300 mg Plavix load, will switch pt to Brilinta 60 mg BID and ASA 81 mg daily. D/w Dr. Herminia Sadler.      Candace Weinberg NP  Neurocritical Care Nurse Practitioner  [phone]

## 2022-02-17 NOTE — ED PROVIDER NOTES
HPI     65-year-old male otherwise healthy presents emergency department in transfer from [facility] [address] after he presented there with acute onset left hand and left foot numbness. He had a CT CTA which showed occlusion of the right vertebral artery. They consulted neuro intervention in the hospitalist and he was transferred here for management. Patient denies any Covid symptoms or exposure. He has not been vaccinated. Denies a headache. Denies any trauma. No past medical history on file. No past surgical history on file. No family history on file. Social History     Socioeconomic History    Marital status:      Spouse name: Not on file    Number of children: Not on file    Years of education: Not on file    Highest education level: Not on file   Occupational History    Not on file   Tobacco Use    Smoking status: Never Smoker    Smokeless tobacco: Never Used   Substance and Sexual Activity    Alcohol use: Yes     Comment: weekends    Drug use: Yes     Types: Marijuana    Sexual activity: Not on file   Other Topics Concern    Not on file   Social History Narrative    Not on file     Social Determinants of Health     Financial Resource Strain:     Difficulty of Paying Living Expenses: Not on file   Food Insecurity:     Worried About Running Out of Food in the Last Year: Not on file    Jacqueline of Food in the Last Year: Not on file   Transportation Needs:     Lack of Transportation (Medical): Not on file    Lack of Transportation (Non-Medical):  Not on file   Physical Activity:     Days of Exercise per Week: Not on file    Minutes of Exercise per Session: Not on file   Stress:     Feeling of Stress : Not on file   Social Connections:     Frequency of Communication with Friends and Family: Not on file    Frequency of Social Gatherings with Friends and Family: Not on file    Attends Jewish Services: Not on file    Active Member of Clubs or Organizations: Not on file    Attends Club or Organization Meetings: Not on file    Marital Status: Not on file   Intimate Partner Violence:     Fear of Current or Ex-Partner: Not on file    Emotionally Abused: Not on file    Physically Abused: Not on file    Sexually Abused: Not on file   Housing Stability:     Unable to Pay for Housing in the Last Year: Not on file    Number of [place] in the Last Year: Not on file    Unstable Housing in the Last Year: Not on file         ALLERGIES: Patient has no known allergies. Review of Systems   Constitutional: Negative for fever. HENT: Negative for congestion. Eyes: Negative for visual disturbance. Respiratory: Negative for cough and shortness of breath. Cardiovascular: Negative for chest pain. Gastrointestinal: Negative for abdominal pain, nausea and vomiting. Endocrine: Negative for polyuria. Genitourinary: Negative for dysuria. Musculoskeletal: Negative for gait problem. Skin: Negative for rash. Neurological: Positive for numbness. Negative for headaches. Psychiatric/Behavioral: Negative for dysphoric mood. There were no vitals filed for this visit. Physical Exam  Constitutional:       General: He is not in acute distress. Appearance: He is well-developed. HENT:      Head: Normocephalic and atraumatic. Mouth/Throat:      Pharynx: No oropharyngeal exudate. Eyes:      General: No scleral icterus. Right eye: No discharge. Left eye: No discharge. Pupils: Pupils are equal, round, and reactive to light. Neck:      Vascular: No JVD. Cardiovascular:      Rate and Rhythm: Normal rate and regular rhythm. Heart sounds: Normal heart sounds. No murmur heard. Pulmonary:      Effort: Pulmonary effort is normal. No respiratory distress. Breath sounds: Normal breath sounds. No stridor. No wheezing or rales. Chest:      Chest wall: No tenderness. Abdominal:      General: Bowel sounds are normal. There is no distension. Palpations: Abdomen is soft. There is no mass. Tenderness: There is no abdominal tenderness. There is no guarding or rebound. Musculoskeletal:         General: Normal range of motion. Cervical back: Normal range of motion and neck supple. Skin:     General: Skin is warm and dry. Capillary Refill: Capillary refill takes less than 2 seconds. Findings: No rash. Neurological:      Mental Status: He is oriented to person, place, and time. Psychiatric:         Behavior: Behavior normal.         Thought Content: Thought content normal.         Judgment: Judgment normal.          [facility]       Procedures      Perfect Serve Consult for Admission  6:19 AM    ED         Room Number: BZ69/61  Patient Name and age:  Hugh Blakely. 27 y.o.  male  Working Diagnosis:   1. Occlusion of right vertebral artery        COVID-19 Suspicion:  no  Sepsis present:  no  Reassessment needed: no  Code Status:  Full Code  Readmission: no  Isolation Requirements:  no  Recommended Level of Care:  telemetry  Department:[facility] Adult ED - 21   Other:  27year old male transferred from [facility] with left hand/leg numbness had CTA that showed occlusion of right vertebral artery. Apparently you accepted him? I'll let Neuro intervention know as well.

## 2022-02-17 NOTE — PROGRESS NOTES
Neurocritical Care Brief Progress Note:    19-year-old male presented to ED [facility] on 22 with left thumb numbness which started on  and left leg numbness that started on Tuesday. Patient has no significant past medical history. Found to have right VA occlusion on CTA H/N. Loaded with aspirin and plavix in the ER. Dr. Diego Blackburn consulted and it was recommended patient be sent to [facility] for NIS consult, MRI and close monitoring. Physical Exam:  Gen: NAD, calm, cooperative, morbidly obese. Neuro: A&Ox4. Follows commands. Speech clear. Affect normal. PERRL, 3 mm bilaterally. Blinks to threat. No disconjugate gaze present. EOMI. Face symmetric. Palate symmetric. Tongue midline. Jr spontaneously. Strength 5/5 in UE and LE BL. Negative drift. Bulk and tone normal. No involuntary movements. Gait deferred. Skin: Warm, dry, color appropriate for ethnicity. NIHSS:      1a-LOC:0    1b-Month/Age:0    1c-Open/Close Hand:0    2-Best Gaze:0    3-Visual Fields:0    4-Facial Palsy:0    5a-Left Arm:0    5b-Right Arm:0    6a-Left Le    6b-Right Le    7-Limb Ataxia:0    8-Sensory:0    9-Best Language:0    10-Dysarthria:0    11-Extinction/Inattention:0  TOTAL SCORE:0    Plan:   -MRI brain stat  -P2Y12 and aspirin level  -Q  2 hour neuro checks    Full consult note by NIS to follow.        Jeremy Melissa NP  Neurocritical Care Nurse Practitioner  [phone]

## 2022-02-17 NOTE — PROGRESS NOTES
SLP Contact Note    Update: MRI negative. Will sign off. Consult received and appreciated. Patient chart reviewed. CT negative for acute infarct. Pt passed swallow screen and therefore can initiate diet. Will await MRI and can complete evaluation if indicated.       Thank you,  LALO CastilloEd, [id number] [facility]  Speech-Language Pathologist

## 2022-02-17 NOTE — PROGRESS NOTES
Transition of Care Plan  1. RUR- N/A   2. DISPOSITION: TBD/subject to change pending recommendations; pending medical progression. -PT/OT and SLP have been consulted. Awaiting recommendations.   - Neurology consulted and following. 3. F/U with PCP/Specialist    4. Transport: Family    CM will continue to follow and assist with SERGIO needs as they arise. Reason for Admission:  TIA (transient ischemic attack)    RUR Score:    N/A                 Plan for utilizing home health:     PT/OT and SLP consulted and following,  Awaiting review and recommendations at this time. PCP: YES First and Last name:  Shruthi Wyman MD     Name of Practice: [address] [place] [address] Primary Care   Are you a current patient: Yes/No: YES   Approximate date of last visit: 2/7/22   Can you participate in a virtual visit with your PCP: YES                    Current Advanced Directive/Advance Care Plan: Full Code      Healthcare Decision Maker:   Spouse: Dilma Bustamante [phone]         27year old male, AOx3. PTA independent with ADL's and IADL's. No DME utilized. Resides with spouse in their own home. Patient is currently employed with [employer]. Patient reports that he does have insurance through his job and is currently awaiting health insurance cards at this time. Unable to provide the name. [facility] is utilized for prescriptions. Care Management Interventions  PCP Verified by CM: Yes  Last Visit to PCP: 02/07/22  Palliative Care Criteria Met (RRAT>21 & CHF Dx)?: No  Mode of Transport at Discharge:  Other (see comment) (Family)  Transition of Care Consult (CM Consult): Discharge Planning  Discharge Durable Medical Equipment: No  Physical Therapy Consult: Yes  Occupational Therapy Consult: Yes  Speech Therapy Consult: Yes  Support Systems: Spouse/Significant Other  Confirm Follow Up Transport: Family  Discharge Location  Patient Expects to be Discharged to[de-identified] Home with family assistance (TBD/subject to change pending recommendations)    CORETTA Roa/CRM  Care Management  12:46 PM

## 2022-02-17 NOTE — ED NOTES
TRANSFER - OUT REPORT:    Verbal report given to DANIELLE Soto(name) on [employer].  being transferred to  (unit) for routine progression of care       Report consisted of patients Situation, Background, Assessment and   Recommendations(SBAR). Information from the following report(s) SBAR, ED Summary, [facility] and Recent Results was reviewed with the receiving nurse. Lines:   Peripheral IV 02/17/22 Right Antecubital (Active)        Opportunity for questions and clarification was provided.       Patient transported with:   [employer]

## 2022-02-17 NOTE — CONSULTS
NeuroInterventional Surgery Consult  Kam Charlton NP    Patient: Dee Layton. MRN: [id number]  SSN: [id number]    YOB: 1990  Age: 27 y.o. Sex: male      Chief Complaint: Left sided paresthesia    Subjective:      Dee Strickland is a 27 y.o. M with no known medical hx who presented to [facility] ED last night reporting left sided numbness in his hand and leg. A head CT was performed which was negative for acute process. CTA head/neck was also performed which showed a right vertebral artery occlusion. He denies any trauma to his neck but does reports that approx 6 months ago, he was driving in his car and turned his head sharply to the right, he described it as \"whipped my head around. \" He reports after he turned his head, he felt an immediate pop in his neck followed by neck pain and a headache that lasted several days. He also reports since then, he has had headaches intermittently but states prior to this even headaches are not usual for him. He does not smoke cigarettes, states he uses marijuana edibles on occasion but denies any other street drugs. He denies any family hx of connective tissue disorders. He does have his neck cracked by a chiropractor but does state he will self-crack his neck sometimes. History reviewed. No pertinent past medical history. History reviewed. No pertinent family history. Social History     Tobacco Use    Smoking status: Never Smoker    Smokeless tobacco: Never Used   Substance Use Topics    Alcohol use: Yes     Comment: weekends      Prior to Admission Medications   Prescriptions Last Dose Informant Patient Reported? Taking? albuterol (PROVENTIL HFA, VENTOLIN HFA, PROAIR HFA) 90 mcg/actuation inhaler Not Taking at Unknown time  No No   Sig: Take 2 Puffs by inhalation every four (4) hours as needed for Wheezing.    Patient not taking: Reported on 2/16/2022      Facility-Administered Medications: None       No Known Allergies    Review of Systems:  A comprehensive review of systems was negative. Objective:     Vitals:    02/17/22 0620   BP: (!) 148/69   Pulse: 75   Resp: 12   Temp: 97.6 °F (36.4 °C)   SpO2: 99%   Weight: (!) 412 lb (186.9 kg)   Height: 5' 11\" (1.803 m)      Physical Exam:  GENERAL: Calm, cooperative, NAD  SKIN: Warm, dry, color appropriate for ethnicity. Neurologic Exam:  Mental Status:  Alert and oriented x 4. Appropriate affect, mood and behavior. Language:    Normal fluency, repetition, comprehension and naming. Cranial Nerves:   Pupils 3 mm, equal, round and reactive to light. Visual fields full to confrontation. Extraocular movements intact. Facial sensation intact. Full facial strength, no asymmetry. Hearing grossly intact bilaterally. No dysarthria. Tongue protrudes to midline, palate elevates symmetrically. Shoulder shrug 5/5 bilaterally. Motor:    No pronator drift. Bulk and tone normal.      5/5 power in all extremities proximally and distally. No involuntary movements. Sensation:    Sensation intact throughout to light touch. Coordination & Gait: Gait deferred. FTN and HTS intact with no ataxia present.     Labs:  Lab Results   Component Value Date/Time    WBC 5.1 02/16/2022 09:20 PM    HGB 12.5 02/16/2022 09:20 PM    HCT 39.4 02/16/2022 09:20 PM    PLATELET 798 26/07/5322 09:20 PM    MCV 87.0 02/16/2022 09:20 PM      Lab Results   Component Value Date/Time    Sodium 138 02/16/2022 09:20 PM    Potassium 4.0 02/16/2022 09:20 PM    Chloride 105 02/16/2022 09:20 PM    CO2 29 02/16/2022 09:20 PM    Anion gap 4 (L) 02/16/2022 09:20 PM    Glucose 84 02/16/2022 09:20 PM    BUN 17 02/16/2022 09:20 PM    Creatinine 1.09 02/16/2022 09:20 PM    BUN/Creatinine ratio 16 02/16/2022 09:20 PM    GFR est AA >60 02/16/2022 09:20 PM    GFR est non-AA >60 02/16/2022 09:20 PM    Calcium 9.2 02/16/2022 09:20 PM     Lab Results   Component Value Date/Time    CK - MB 2.3 11/24/2021 09:05 PM    CK-MB Index 0.6 11/24/2021 09:05 PM    Troponin-I, Qt. <0.04 09/22/2017 03:04 AM       Imaging:  CT Results (maximum last 3): Results from Hospital Encounter encounter on 02/16/22    CTA HEAD NECK W CONT    Narrative  *PRELIMINARY REPORT*    Nonenhancement of right vertebral artery compatible with occlusion of an  indeterminate age. No other large vessel occlusion. Paranasal sinus  opacification    Preliminary report was provided by Dr. Wicho Cortez, the on-call radiologist, at  23:10    Final report to follow. *END PRELIMINARY REPORT*    FINAL REPORT:    CLINICAL HISTORY: Left-sided paresthesias    EXAMINATION:  CT ANGIOGRAPHY HEAD AND NECK    COMPARISON: CT head 2/16/2022    TECHNIQUE:  Following the uneventful administration of iodinated contrast  material, axial CT angiography of the head and neck was performed. Delayed axial  images through the head were also obtained. Coronal and sagittal reconstructions  were obtained. Manual postprocessing of images was performed. 3-D  Sagittal  maximal intensity projection images were obtained. 3-D Coronal maximal  intensity projections were obtained. CT dose reduction was achieved through use  of a standardized protocol tailored for this examination and automatic exposure  control for dose modulation. FINDINGS:    Delayed contrast-enhanced head CT:    The ventricles are midline without hydrocephalus. There is no acute intra or  extra-axial hemorrhage. Gray-white matter differentiation is preserved. The  basal cisterns are clear. Complete opacification of the frontal sinuses and  right maxillary sinus with near complete opacification of the left maxillary  sinus. Mild to moderate anterior ethmoidal sinus disease.     CTA NECK:    Great vessels: Patent origins    Right subclavian artery: Patent    Left subclavian artery: Patent    Right common carotid artery: Patent    Left common carotid artery: Patent    Cervical right internal carotid artery: Patent with no significant stenosis by  NASCET criteria. Cervical left internal carotid artery: Patent with no significant stenosis by  NASCET criteria. Right vertebral artery: Poorly visualized and may be extremely diminutive in  caliber. Left vertebral artery: Patent and dominant    Several borderline enlarged anterior mediastinal lymph nodes of uncertain  significance. Nonspecific nodular opacities in the lung apices. CTA HEAD:    Right cavernous internal carotid artery: Patent    Left cavernous internal carotid artery: Patent    Anterior cerebral arteries: Patent    Anterior communicating artery: Patent    Right middle cerebral artery: Patent    Left middle cerebral artery: Patent    Posterior communicating arteries: Hypoplastic    Posterior cerebral arteries: Patent    Basilar artery: Patent    Distal vertebral arteries: Left is patent and dominant. Right is extremely  diminutive in caliber and may terminate in PICA    No evidence for intracranial aneurysm    Measurements use NASCET criteria. Impression  Nonvisualization of the right vertebral artery may be due to extremely  diminutive caliber. Slightly suboptimal bolus timing. No other evidence for any  arterial occlusion or significant stenosis. No evidence for intracranial  aneurysm. Severe sinus disease. Several borderline enlarged anterior mediastinal lymph nodes of uncertain  significance. Nonspecific nodular opacities in the lung apices. Recommend  follow-up CT of the chest in 1-3 months. 23X      CT HEAD WO CONT    Narrative  EXAM: CT HEAD WO CONT    INDICATION: L thumb tingling, L leg tingling    COMPARISON: None. CONTRAST: None. TECHNIQUE: Unenhanced CT of the head was performed using 5 mm images. Brain and  bone windows were generated. Coronal and sagittal reformats. CT dose reduction  was achieved through use of a standardized protocol tailored for this  examination and automatic exposure control for dose modulation.     FINDINGS:  The ventricles and sulci are normal in size, shape and configuration. . There is  no significant white matter disease. There is no intracranial hemorrhage,  extra-axial collection, or mass effect. The basilar cisterns are open. No CT  evidence of acute infarct. The bone windows demonstrate no abnormalities. There is opacification of the  maxillary sinuses, right greater than left, the bilateral ethmoid sinuses, and  the left sphenoid sinus. There is opacification of the frontal sinuses  bilaterally. Impression  Sinus disease. No acute intracranial findings. Results from [place] encounter on 11/24/21    CTA CHEST W OR W WO CONT    Narrative  Clinical indication: Shortness of breath and chest pain. Localizer obtained without contrast at the left ovary arteries. Fast injection  rate of 100 cc of Isovue with review of the raw data and MIP reconstructions. No  prior. CT dose reduction was achieved through the use of a standardized protocol  tailored for this examination and automatic exposure control for dose modulation  . Pinky Angles There is no evidence for pulmonary emboli. Prominent nodes seen in the  mediastinum and leandro. There is no pericardial pleural effusions. There is no parenchymal mass or significant infiltrate. Bibasal atelectasis. Impression  Bibasal atelectasis, no pulmonary emboli. Assessment:     Hospital Problems  Date Reviewed: 2/17/2022          Codes Class Noted POA    * (Principal) TIA (transient ischemic attack) ICD-10-CM: G45.9  ICD-9-CM: 435.9  2/17/2022 Unknown            Plan:     Vertebral artery occlusion   - Likely secondary to dissection    - Received  mg and 300 mg Plavix loading dose prior to transfer   - Cont DAPT with ASA and Plavix, will check assays this am and dose accordingly   - MRI brain ordered to assess for CVA   - q2 hour neuro checks    I have discussed the diagnosis and the intended plan as seen in the above orders with Dr. Constantine Jain.     Thank you for this consult and participating in the care of this patient.   Signed By: Mao Nieto NP     February 17, 2022

## 2022-02-17 NOTE — ED PROVIDER NOTES
EMERGENCY DEPARTMENT HISTORY AND PHYSICAL EXAM     ----------------------------------------------------------------------------  Please note that this dictation was completed with [employer], the computer voice recognition software. Quite often unanticipated grammatical, syntax, homophones, and other interpretive errors are inadvertently transcribed by the computer software. Please disregard these errors. Please excuse any errors that have escaped final proofreading  ----------------------------------------------------------------------------      Date: 2/16/2022  Patient Name: William Bagley. History of Presenting Illness     Chief Complaint   Patient presents with    Numbness     pt presents w/ c/o left thumb numbness since sunday now having margy feet tingling and hand left tingling       History Provided By:  Patient    HPI: William Bagley. is a 27 y.o. male, without significant pmhx, who presents via private vehicle to the ED with c/o 4 days of L thumb tingling sensation for the last 4 days. Denies having intermittent waxing and waning in severity of tingling sensation. Also notes having left lower extremity tingling sensation that progressed throughout today prompting him to come emergency department for further evaluation. Denies any recent fall, injury or trauma. No prior similar symptoms in the past.  Does not currently take any medications. Notes that he is a  denies leaning on his left upper extremity or left lower extremity while driving for any prolonged amount of time. Denies lower extremity swelling or pain/redness. Patient also specifically denies any associated fevers, chills, CP, SOB, nausea, vomiting, diarrhea, abd pain, changes in BM, urinary sxs, or headache. Social Hx: denies tobacco  denies EtOH , denies recreational/Illicit Drugs    There are no other complaints, changes, or physical findings at this time.      PCP: None    No Known Allergies    Current Outpatient Medications   Medication Sig Dispense Refill    albuterol (PROVENTIL HFA, VENTOLIN HFA, PROAIR HFA) 90 mcg/actuation inhaler Take 2 Puffs by inhalation every four (4) hours as needed for Wheezing. (Patient not taking: Reported on 2/16/2022) 1 Each 0       Past History     Past Medical History:  History reviewed. No pertinent past medical history. Past Surgical History:  History reviewed. No pertinent surgical history. Family History:  History reviewed. No pertinent family history. Social History:  Social History     Tobacco Use    Smoking status: Never Smoker    Smokeless tobacco: Never Used   Substance Use Topics    Alcohol use: Yes     Comment: weekends    Drug use: Yes     Types: Marijuana       Allergies:  No Known Allergies      Review of Systems   Review of Systems   Constitutional: Negative for chills and fever. HENT: Negative. Eyes: Negative. Respiratory: Negative for cough, chest tightness and shortness of breath. Cardiovascular: Negative for chest pain and leg swelling. Gastrointestinal: Negative for abdominal pain, diarrhea, nausea and vomiting. Endocrine: Negative. Genitourinary: Negative for difficulty urinating and dysuria. Musculoskeletal: Negative for myalgias. Skin: Negative. Neurological: Negative. Tingling to L thumb and L leg   Psychiatric/Behavioral: Negative. All other systems reviewed and are negative. Physical Exam   Physical Exam  Vitals and nursing note reviewed. Constitutional:       General: He is not in acute distress. Appearance: He is well-developed. He is not diaphoretic. HENT:      Head: Normocephalic and atraumatic. Nose: Nose normal.      Mouth/Throat:      Pharynx: No oropharyngeal exudate. Eyes:      Conjunctiva/sclera: Conjunctivae normal.      Pupils: Pupils are equal, round, and reactive to light. Neck:      Vascular: No JVD. Cardiovascular:      Rate and Rhythm: Normal rate and regular rhythm. Heart sounds: Normal heart sounds. No murmur heard. No friction rub. Pulmonary:      Effort: Pulmonary effort is normal. No respiratory distress. Breath sounds: Normal breath sounds. No stridor. No wheezing or rales. Abdominal:      General: Bowel sounds are normal. There is no distension. Palpations: Abdomen is soft. Tenderness: There is no abdominal tenderness. There is no rebound. Musculoskeletal:         General: No tenderness. Normal range of motion. Cervical back: Normal range of motion and neck supple. Skin:     General: Skin is warm and dry. Findings: No rash. Neurological:      Mental Status: He is alert and oriented to person, place, and time. Cranial Nerves: No cranial nerve deficit. Psychiatric:         Speech: Speech normal.         Behavior: Behavior normal.         Thought Content: Thought content normal.         Judgment: Judgment normal.           Diagnostic Study Results     Labs -     Recent Results (from the past 12 hour(s))   CBC WITH AUTOMATED DIFF    Collection Time: 02/16/22  9:20 PM   Result Value Ref Range    WBC 5.1 4.1 - 11.1 K/uL    RBC 4.53 4.10 - 5.70 M/uL    HGB 12.5 12.1 - 17.0 g/dL    HCT 39.4 36.6 - 50.3 %    MCV 87.0 80.0 - 99.0 FL    MCH 27.6 26.0 - 34.0 PG    MCHC 31.7 30.0 - 36.5 g/dL    RDW 13.7 11.5 - 14.5 %    PLATELET 704 521 - 547 K/uL    MPV 8.7 (L) 8.9 - 12.9 FL    NRBC 0.0 0  WBC    ABSOLUTE NRBC 0.00 0.00 - 0.01 K/uL    NEUTROPHILS 59 32 - 75 %    LYMPHOCYTES 27 12 - 49 %    MONOCYTES 9 5 - 13 %    EOSINOPHILS 4 0 - 7 %    BASOPHILS 1 0 - 1 %    IMMATURE GRANULOCYTES 0 0.0 - 0.5 %    ABS. NEUTROPHILS 3.1 1.8 - 8.0 K/UL    ABS. LYMPHOCYTES 1.4 0.8 - 3.5 K/UL    ABS. MONOCYTES 0.5 0.0 - 1.0 K/UL    ABS. EOSINOPHILS 0.2 0.0 - 0.4 K/UL    ABS. BASOPHILS 0.0 0.0 - 0.1 K/UL    ABS. IMM.  GRANS. 0.0 0.00 - 0.04 K/UL    DF AUTOMATED     METABOLIC PANEL, COMPREHENSIVE    Collection Time: 02/16/22  9:20 PM   Result Value Ref Range Sodium 138 136 - 145 mmol/L    Potassium 4.0 3.5 - 5.1 mmol/L    Chloride 105 97 - 108 mmol/L    CO2 29 21 - 32 mmol/L    Anion gap 4 (L) 5 - 15 mmol/L    Glucose 84 65 - 100 mg/dL    BUN 17 6 - 20 MG/DL    Creatinine 1.09 0.70 - 1.30 MG/DL    BUN/Creatinine ratio 16 12 - 20      GFR est AA >60 >60 ml/min/1.73m2    GFR est non-AA >60 >60 ml/min/1.73m2    Calcium 9.2 8.5 - 10.1 MG/DL    Bilirubin, total 0.4 0.2 - 1.0 MG/DL    ALT (SGPT) 73 12 - 78 U/L    AST (SGOT) 45 (H) 15 - 37 U/L    Alk. phosphatase 97 45 - 117 U/L    Protein, total 7.9 6.4 - 8.2 g/dL    Albumin 3.2 (L) 3.5 - 5.0 g/dL    Globulin 4.7 (H) 2.0 - 4.0 g/dL    A-G Ratio 0.7 (L) 1.1 - 2.2     PROTHROMBIN TIME + INR    Collection Time: 02/17/22 12:15 AM   Result Value Ref Range    INR 1.0 0.9 - 1.1      Prothrombin time 10.3 9.0 - 11.1 sec   PTT    Collection Time: 02/17/22 12:15 AM   Result Value Ref Range    aPTT 26.0 22.1 - 31.0 sec    aPTT, therapeutic range     58.0 - 77.0 SECS       Radiologic Studies -   CT HEAD WO CONT   Final Result   Sinus disease. No acute intracranial findings. CTA HEAD NECK W CONT           CT Results  (Last 48 hours)               02/16/22 4155  CT HEAD WO CONT Final result    Impression:  Sinus disease. No acute intracranial findings. Narrative:  EXAM: CT HEAD WO CONT       INDICATION: L thumb tingling, L leg tingling       COMPARISON: None. CONTRAST: None. TECHNIQUE: Unenhanced CT of the head was performed using 5 mm images. Brain and   bone windows were generated. Coronal and sagittal reformats. CT dose reduction   was achieved through use of a standardized protocol tailored for this   examination and automatic exposure control for dose modulation. FINDINGS:   The ventricles and sulci are normal in size, shape and configuration. . There is   no significant white matter disease. There is no intracranial hemorrhage,   extra-axial collection, or mass effect.  The basilar cisterns are open. No CT   evidence of acute infarct. The bone windows demonstrate no abnormalities. There is opacification of the   maxillary sinuses, right greater than left, the bilateral ethmoid sinuses, and   the left sphenoid sinus. There is opacification of the frontal sinuses   bilaterally. 02/16/22 2255  CTA HEAD NECK W CONT Preliminary result    Narrative:  *PRELIMINARY REPORT*       Nonenhancement of right vertebral artery compatible with occlusion of an   indeterminate age. No other large vessel occlusion. Paranasal sinus   opacification       Preliminary report was provided by Dr. Lloyd Atkinson, the on-call radiologist, at   23:10       Final report to follow. *END PRELIMINARY REPORT*                                   CXR Results  (Last 48 hours)    None            Medical Decision Making   I am the first provider for this patient. I reviewed the vital signs, available nursing notes, past medical history, past surgical history, family history and social history. Vital Signs-Reviewed the patient's vital signs. Patient Vitals for the past 12 hrs:   Temp Pulse Resp BP SpO2   02/17/22 0019 98.1 °F (36.7 °C) 76 22 (!) 157/74 99 %   02/16/22 2115 98.4 °F (36.9 °C) 84 18 (!) 174/79 100 %       Pulse Oximetry Analysis - 100% on RA, normal  Rate: 84 bpm  Rhythm: Normal sinus rhythm      Provider Notes (Medical Decision Making):     DDX:  Paresthesias, electrolyte derangement, hyperglycemia, neuropathy, TIA,  vascular occlusive pathology    Plan:  Labs, head CT, CTA    Impression:  Vertebral artery occlusion    ED Course:   Initial assessment performed. The patients presenting problems have been discussed, and they are in agreement with the care plan formulated and outlined with them. I have encouraged them to ask questions as they arise throughout their visit.     I reviewed the nursing notes and and vital signs from today's visit, as well as the electronic medical record system for any past medical records that were available that may contribute to the patients current condition, including previous primary care visits for well adult exam earlier this month    Nursing notes will be reviewed as they become available in realtime while the pt has been in the ED. King Melissa MD      I personally reviewed/interpreted pt's imaging. Agree with official read by radiology as noted above. King Melissa MD    PROGRESS NOTE:  11:45 PM  Pt noted to have significant finding on CTA of the vertebral artery occlusion. Will consult with neuro intervention for further recommendations of plan. King Melissa MD      CONSULT NOTE:   11:45 PM  King Melissa MD spoke with Dr. Terrell Rivers,   Specialty: Myrna Rivers due to noted vertebral artery occlusion on CT of indeterminate age. Discussed pt's HPI and available diagnostic results thus far. Consultant recommends having images sent to visit so that he can review them and will call back with further recommendations. King Melissa MD    PROGRESS NOTE  11:55 PM  Received call back from Dr. Klever day intervention who was able to review patient's imaging. Recommends loading with Plavix and aspirin. Also recommends transfer to [facility] for every 2 neuro checks to ensure no further decompensation of patient's symptoms with further work-up of MRI. Will consult with transfer center to arrange for transfer to [facility]. CONSULT NOTE:   12:51 AM  King Melissa MD spoke with Dr. Yair Floyd,   Specialty: Baylee Floyd due to pt with vertebral artery occlusion. Discussed pt's HPI and available diagnostic results thus far. Expressed concerns for needed admission. Consultant will evaluate for admission.   King Melissa MD      CONSULT NOTE:   12:51 Vidhya Pal MD spoke with Dr. Lonnie Walker,   Specialty: ED  Consulted Dr. Lonnie Walker due to need for transfer of pt without available step down/intermediate care beds available. Discussed pt's HPI and available diagnostic results thus far. Dr. Lonnie Walker accepts pt to the ED  King Melissa MD             Critical Care Time:     CRITICAL CARE NOTE  IMPENDING DETERIORATION -Airway, Respiratory, Cardiovascular, CNS and Metabolic  ASSOCIATED RISK FACTORS - Dysrhythmia, Metabolic changes, Dehydration, Vascular Compromise and CNS Decompensation  MANAGEMENT- Bedside Assessment and Supervision of Care  INTERPRETATION -  CT Scan, Blood Pressure and Cardiac Output Measures   INTERVENTIONS - hemodynamic mngmt, vascular control, Neurologic interventions  and Metobolic interventions  CASE REVIEW - Hospitalist/Intensivist, Medical Sub-Specialist and Nursing  TREATMENT RESPONSE -Improved  PERFORMED BY - Self  NOTES   :  I have spent 60 minutes of critical care time involved in lab review, consultations with specialist, family decision- making, bedside attention and documentation excluding time spent on any separately billed procedures. During this entire length of time I was immediately available to the patient . King Melissa MD      Diagnosis     Clinical Impression:   1. Vertebral artery occlusion, right    2. Left hand paresthesia    3. Left leg paresthesias        PLAN:  1.  Transfer to [facility] ED

## 2022-02-17 NOTE — CONSULTS
INPATIENT NEUROLOGY CONSULTATION  2/17/2022     Consulted by: Randa El MD        Patient ID:  Jennifer Hargrove  [id number]  27 y.o.  1990    CC: Left hand numbness and tingling    JH Shah is a 57-year-old gentleman with no medical history who tells me that about 5 days ago he noticed his left thumb with tingling that progressed to the whole hand and 2 days later it had resolved completely. Yesterday symptoms returned to also affect the left calf. He came to the hospital to be evaluated at [address] [place] [address] revealing a right vertebral occlusion. No headache. No loss of vision or double vision. Symptoms have resolved and he feels fine now. He does admit that maybe 6 months ago while driving he turned his head real fast feeling a pop. 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 Review of Systems   Eyes: Negative for double vision. Neurological: Positive for tingling and sensory change. Negative for speech change and weakness. All other systems reviewed and are negative. History reviewed. No pertinent past medical history. History reviewed. No pertinent family history.   Social History     Socioeconomic History    Marital status:      Spouse name: Not on file    Number of children: Not on file    Years of education: Not on file    Highest education level: Not on file   Occupational History    Not on file   Tobacco Use    Smoking status: Never Smoker    Smokeless tobacco: Never Used   Vaping Use    Vaping Use: Never used   Substance and Sexual Activity    Alcohol use: Yes     Comment: weekends    Drug use: Yes     Types: Marijuana    Sexual activity: Not on file   Other Topics Concern    Not on file   Social History Narrative    Not on file     Social Determinants of Health     Financial Resource Strain:     Difficulty of Paying Living Expenses: Not on file   Food Insecurity:     Worried About Running Out of Food in the Last Year: Not on file   [facility] Ran Out of Food in the Last Year: Not on file   Transportation Needs:     Lack of Transportation (Medical): Not on file    Lack of Transportation (Non-Medical): Not on file   Physical Activity:     Days of Exercise per Week: Not on file    Minutes of Exercise per Session: Not on file   Stress:     Feeling of Stress : Not on file   Social Connections:     Frequency of Communication with Friends and Family: Not on file    Frequency of Social Gatherings with Friends and Family: Not on file    Attends Yazidism Services: Not on file    Active Member of Clubs or Organizations: Not on file    Attends Club or Organization Meetings: Not on file    Marital Status: Not on file   Intimate Partner Violence:     Fear of Current or Ex-Partner: Not on file    Emotionally Abused: Not on file    Physically Abused: Not on file    Sexually Abused: Not on file   Housing Stability:     Unable to Pay for Housing in the Last Year: Not on file    Number of Places Lived in the Last Year: Not on file    Unstable Housing in the Last Year: Not on file     Current Facility-Administered Medications   Medication Dose Route Frequency    acetaminophen (TYLENOL) tablet 650 mg  650 mg Oral Q4H PRN    Or    acetaminophen (TYLENOL) solution 650 mg  650 mg Per NG tube Q4H PRN    Or    acetaminophen (TYLENOL) suppository 650 mg  650 mg Rectal Q4H PRN    0.9% sodium chloride infusion  75 mL/hr IntraVENous CONTINUOUS     Current Outpatient Medications   Medication Sig    albuterol (PROVENTIL HFA, VENTOLIN HFA, PROAIR HFA) 90 mcg/actuation inhaler Take 2 Puffs by inhalation every four (4) hours as needed for Wheezing. (Patient not taking: Reported on 2/16/2022)     No Known Allergies    Visit Vitals  BP (!) 148/69 (BP 1 Location: Left upper arm)   Pulse 75   Temp 97.6 °F (36.4 °C)   Resp 12   Ht 5' 11\" (1.803 m)   Wt (!) 412 lb (186.9 kg)   SpO2 99%   BMI 57.46 kg/m²     Physical Exam  Vitals and nursing note reviewed. Constitutional:       Appearance: Normal appearance. He is obese. Neurological:      Mental Status: He is alert. Neurologic Exam     Mental Status   Age-appropriate gentleman awake and alert. Follows commands. Pupils equal reactive symmetric with a conjugate gaze, EOMI  Face is grossly symmetric tongue is midline speech is clear  Strength is 5/5 supine all extremities symmetric  Sensation intact  Finger-to-nose symmetric  No abnormal movements  Gait steady transferring to wheelchair from [facility]            Lab Results   Component Value Date/Time    WBC 5.1 02/16/2022 09:20 PM    HGB 12.5 02/16/2022 09:20 PM    HCT 39.4 02/16/2022 09:20 PM    PLATELET 842 64/14/4891 09:20 PM    MCV 87.0 02/16/2022 09:20 PM     Lab Results   Component Value Date/Time    Glucose 84 02/16/2022 09:20 PM    Creatinine 1.09 02/16/2022 09:20 PM      No results found for: CHOL, CHOLPOCT, HDL, LDL, LDLC, LDLCPOC, LDLCEXT, TRIGL, TGLPOCT, CHHD, CHHDX  Lab Results   Component Value Date/Time    ALT (SGPT) 73 02/16/2022 09:20 PM    Alk. phosphatase 97 02/16/2022 09:20 PM    Bilirubin, total 0.4 02/16/2022 09:20 PM    Albumin 3.2 (L) 02/16/2022 09:20 PM    Protein, total 7.9 02/16/2022 09:20 PM    INR 1.0 02/17/2022 12:15 AM    Prothrombin time 10.3 02/17/2022 12:15 AM    PLATELET 402 78/06/7612 09:20 PM        CT Results (maximum last 3): Results from Hospital Encounter encounter on 02/16/22    CTA HEAD NECK W CONT    Narrative  *PRELIMINARY REPORT*    Nonenhancement of right vertebral artery compatible with occlusion of an  indeterminate age. No other large vessel occlusion. Paranasal sinus  opacification    Preliminary report was provided by Dr. Jose Juan Paul, the on-call radiologist, at  23:10    Final report to follow.     *END PRELIMINARY REPORT*    FINAL REPORT:    CLINICAL HISTORY: Left-sided paresthesias    EXAMINATION:  CT ANGIOGRAPHY HEAD AND NECK    COMPARISON: CT head 2/16/2022    TECHNIQUE:  Following the uneventful administration of iodinated contrast  material, axial CT angiography of the head and neck was performed. Delayed axial  images through the head were also obtained. Coronal and sagittal reconstructions  were obtained. Manual postprocessing of images was performed. 3-D  Sagittal  maximal intensity projection images were obtained. 3-D Coronal maximal  intensity projections were obtained. CT dose reduction was achieved through use  of a standardized protocol tailored for this examination and automatic exposure  control for dose modulation. FINDINGS:    Delayed contrast-enhanced head CT:    The ventricles are midline without hydrocephalus. There is no acute intra or  extra-axial hemorrhage. Gray-white matter differentiation is preserved. The  basal cisterns are clear. Complete opacification of the frontal sinuses and  right maxillary sinus with near complete opacification of the left maxillary  sinus. Mild to moderate anterior ethmoidal sinus disease. CTA NECK:    Great vessels: Patent origins    Right subclavian artery: Patent    Left subclavian artery: Patent    Right common carotid artery: Patent    Left common carotid artery: Patent    Cervical right internal carotid artery: Patent with no significant stenosis by  NASCET criteria. Cervical left internal carotid artery: Patent with no significant stenosis by  NASCET criteria. Right vertebral artery: Poorly visualized and may be extremely diminutive in  caliber. Left vertebral artery: Patent and dominant    Several borderline enlarged anterior mediastinal lymph nodes of uncertain  significance. Nonspecific nodular opacities in the lung apices.     CTA HEAD:    Right cavernous internal carotid artery: Patent    Left cavernous internal carotid artery: Patent    Anterior cerebral arteries: Patent    Anterior communicating artery: Patent    Right middle cerebral artery: Patent    Left middle cerebral artery: Patent    Posterior communicating arteries: Hypoplastic    Posterior cerebral arteries: Patent    Basilar artery: Patent    Distal vertebral arteries: Left is patent and dominant. Right is extremely  diminutive in caliber and may terminate in PICA    No evidence for intracranial aneurysm    Measurements use NASCET criteria. Impression  Nonvisualization of the right vertebral artery may be due to extremely  diminutive caliber. Slightly suboptimal bolus timing. No other evidence for any  arterial occlusion or significant stenosis. No evidence for intracranial  aneurysm. Severe sinus disease. Several borderline enlarged anterior mediastinal lymph nodes of uncertain  significance. Nonspecific nodular opacities in the lung apices. Recommend  follow-up CT of the chest in 1-3 months. 23X      CT HEAD WO CONT    Narrative  EXAM: CT HEAD WO CONT    INDICATION: L thumb tingling, L leg tingling    COMPARISON: None. CONTRAST: None. TECHNIQUE: Unenhanced CT of the head was performed using 5 mm images. Brain and  bone windows were generated. Coronal and sagittal reformats. CT dose reduction  was achieved through use of a standardized protocol tailored for this  examination and automatic exposure control for dose modulation. FINDINGS:  The ventricles and sulci are normal in size, shape and configuration. . There is  no significant white matter disease. There is no intracranial hemorrhage,  extra-axial collection, or mass effect. The basilar cisterns are open. No CT  evidence of acute infarct. The bone windows demonstrate no abnormalities. There is opacification of the  maxillary sinuses, right greater than left, the bilateral ethmoid sinuses, and  the left sphenoid sinus. There is opacification of the frontal sinuses  bilaterally. Impression  Sinus disease. No acute intracranial findings. MRI Results (maximum last 3): No results found for this or any previous visit. VAS/US/Carotid Doppler Results (maximum last 3):   No results found for this or any previous visit. PET Results (maximum last 3): No results found for this or any previous visit. Assessment and Plan        41-year-old gentleman with a right vertebral occlusion who presented with left-sided paresthesias. MRI pending being done this morning. Stay on dual antiplatelets. Every 2 hours checks. Any acute change activate code stroke. Echo needs to be done. Incidentally imaging reveals lymphadenopathy in the chest, defer to primary team.  Following     During this evaluation, we also discussed stroke education to include signs and symptoms of stroke and TIA. This clinical note was dictated with an electronic dictation software that can make unintentional errors. If there are any questions, please contact me directly for clarification.       [address] [facility], DO  NEUROLOGIST  Diplomate [facility]OL[facility]  2/17/2022

## 2022-02-17 NOTE — ED TRIAGE NOTES
Patient reports that he noticed numbness in his left thumb on Sunday, and through the week noticed that he had some numbness into his left foot as well. Patient states the numbness has resolved in his foot but does still have numbness in his thumb. Patient transferred here from [facility] for Neuro coverage after CTA determined patient has an occlusion of the right vertebral artery.

## 2022-02-17 NOTE — ED NOTES
Pt arrives ambulatory to the ED with c/o left thumb tingling sensation for the last 4 days. Pt states he also has had left lower extremity tingling sensation that progressed throughout today prompting him to come emergency department for further evaluation. Denies any recent fall, injury or trauma. No prior similar symptoms in the past.    Pt denies fevers, chills, CP, SOB, nausea, vomiting, diarrhea, abd pain, changes in BM, urinary sxs, or headache. Pt placed x3 on monitor, bed in low position, call bell in reach. Pt a/o x4    0140: TRANSFER - OUT REPORT:    Verbal report given to Abram Valentine RN (name) on [facility].  being transferred to [facility] ED(unit) for urgent transfer       Report consisted of patients Situation, Background, Assessment and   Recommendations(SBAR). Information from the following report(s) SBAR, Kardex, ED Summary, MAR, Recent Results and Cardiac Rhythm NSR was reviewed with the receiving nurse. Lines:   Peripheral IV 02/16/22 Right Antecubital (Active)   Site Assessment Clean, dry, & intact 02/17/22 0021   Phlebitis Assessment 0 02/17/22 0021   Infiltration Assessment 0 02/17/22 0021   Dressing Status Clean, dry, & intact 02/17/22 0021   Hub Color/Line Status Pink 02/17/22 0021        Opportunity for questions and clarification was provided. Patient transported [facility] ED with    0144: Bedside and Verbal shift change report given to Yecenia Omer RN (oncoming nurse) by Rico Parekh RN (offgoing nurse). Report included the following information SBAR, Kardex, ED Summary, MAR, Recent Results and Cardiac Rhythm NSR.

## 2022-02-17 NOTE — ED NOTES
Bedside shift change report given to Malorie Mckee RN (oncoming nurse) by Radha Quan RN (offgoing nurse). Report included the following information SBAR, ED Summary, MAR and Recent Results.

## 2022-02-17 NOTE — H&P
History & Physical    Primary Care Provider: Wilma Valentine MD  Source of Information: Patient     Please note that this dictation was completed with XMLAW, the computer voice recognition software. Quite often unanticipated grammatical, syntax, homophones, and other interpretive errors are inadvertently transcribed by the computer software. Please disregard these errors. Please excuse any errors that have escaped final proofreading. History of Presenting Illness:   Mary Monroy is a 27 y.o. male who presents with  4 days of L thumb tingling sensation for the last 4 days. Patient also had lower extremity since tingling sensation that progressed throughout the day yesterday and came to the emergency department of [facility]. There they did some preliminary work-up and showing left vertebral artery occlusion and thereafter neuro interventional surgery was consulted and decided to transfer to [facility] for further management stat MRI was ordered patient was loaded with aspirin Plavix. The patient denies any fever, chills, chest pain, cough, congestion, recent illness, palpitations, or dysuria. Review of Systems:  Pertinent items are noted in the History of Present Illness. History reviewed. No pertinent past medical history. History reviewed. No pertinent surgical history. Prior to Admission medications    Medication Sig Start Date End Date Taking? Authorizing Provider   albuterol (PROVENTIL HFA, VENTOLIN HFA, PROAIR HFA) 90 mcg/actuation inhaler Take 2 Puffs by inhalation every four (4) hours as needed for Wheezing. Patient not taking: Reported on 2/16/2022 11/25/21   Scott Polanco MD     No Known Allergies   History reviewed. No pertinent family history.      SOCIAL HISTORY:  Patient resides:  Independently x   Assisted Living    SNF    With family care       Smoking history:   None x   Former    Chronic      Alcohol history:   None x   Social    Chronic      Ambulates:   Independently x   w/cane    w/walker    w/wc    CODE STATUS:  DNR    Full x   Other      Objective:     Physical Exam:     Visit Vitals  BP (!) 148/69 (BP 1 Location: Left upper arm)   Pulse 75   Temp 97.6 °F (36.4 °C)   Resp 12   Ht 5' 11\" (1.803 m)   Wt (!) 186.9 kg (412 lb)   SpO2 99%   BMI 57.46 kg/m²      O2 Device: None (Room air)    General:  Alert, cooperative, no distress, appears stated age. Head:  Normocephalic, without obvious abnormality, atraumatic. Eyes:  Conjunctivae/corneas clear. PERRL, EOMs intact. Nose: Nares normal. Septum midline. Mucosa normal. No drainage or sinus tenderness. Throat: Lips, mucosa, and tongue normal. Teeth and gums normal.   Neck: Supple, symmetrical, trachea midline,  no carotid bruit and no JVD. Lungs:   Clear to auscultation bilaterally. Heart:  Regular rate and rhythm, S1, S2 normal, no murmur, click, rub or gallop. Abdomen:   Soft, non-tender. Bowel sounds normal. No masses,  No organomegaly. Extremities: Extremities normal, atraumatic, no cyanosis or edema. Pulses: 2+ and symmetric all extremities. Skin: Skin color, texture, turgor normal. No rashes or lesions   Neurologic: CNII-XII intact. Left-sided weakness of lower extremity 4 x 5 compared to right         EKG:  normal sinus rhythm. Data Review:     Recent Days:  Recent Labs     02/16/22 2120   WBC 5.1   HGB 12.5   HCT 39.4        Recent Labs     02/17/22  0015 02/16/22 2120   NA  --  138   K  --  4.0   CL  --  105   CO2  --  29   GLU  --  84   BUN  --  17   CREA  --  1.09   CA  --  9.2   ALB  --  3.2*   TBILI  --  0.4   ALT  --  73   INR 1.0  --      No results for input(s): PH, PCO2, PO2, HCO3, FIO2 in the last 72 hours.     24 Hour Results:  Recent Results (from the past 24 hour(s))   CBC WITH AUTOMATED DIFF    Collection Time: 02/16/22  9:20 PM   Result Value Ref Range    WBC 5.1 4.1 - 11.1 K/uL    RBC 4.53 4.10 - 5.70 M/uL    HGB 12.5 12.1 - 17.0 g/dL    HCT 39.4 36.6 - 50.3 %    MCV 87.0 80.0 - 99.0 FL    MCH 27.6 26.0 - 34.0 PG    MCHC 31.7 30.0 - 36.5 g/dL    RDW 13.7 11.5 - 14.5 %    PLATELET 459 315 - 588 K/uL    MPV 8.7 (L) 8.9 - 12.9 FL    NRBC 0.0 0  WBC    ABSOLUTE NRBC 0.00 0.00 - 0.01 K/uL    NEUTROPHILS 59 32 - 75 %    LYMPHOCYTES 27 12 - 49 %    MONOCYTES 9 5 - 13 %    EOSINOPHILS 4 0 - 7 %    BASOPHILS 1 0 - 1 %    IMMATURE GRANULOCYTES 0 0.0 - 0.5 %    ABS. NEUTROPHILS 3.1 1.8 - 8.0 K/UL    ABS. LYMPHOCYTES 1.4 0.8 - 3.5 K/UL    ABS. MONOCYTES 0.5 0.0 - 1.0 K/UL    ABS. EOSINOPHILS 0.2 0.0 - 0.4 K/UL    ABS. BASOPHILS 0.0 0.0 - 0.1 K/UL    ABS. IMM. GRANS. 0.0 0.00 - 0.04 K/UL    DF AUTOMATED     METABOLIC PANEL, COMPREHENSIVE    Collection Time: 02/16/22  9:20 PM   Result Value Ref Range    Sodium 138 136 - 145 mmol/L    Potassium 4.0 3.5 - 5.1 mmol/L    Chloride 105 97 - 108 mmol/L    CO2 29 21 - 32 mmol/L    Anion gap 4 (L) 5 - 15 mmol/L    Glucose 84 65 - 100 mg/dL    BUN 17 6 - 20 MG/DL    Creatinine 1.09 0.70 - 1.30 MG/DL    BUN/Creatinine ratio 16 12 - 20      GFR est AA >60 >60 ml/min/1.73m2    GFR est non-AA >60 >60 ml/min/1.73m2    Calcium 9.2 8.5 - 10.1 MG/DL    Bilirubin, total 0.4 0.2 - 1.0 MG/DL    ALT (SGPT) 73 12 - 78 U/L    AST (SGOT) 45 (H) 15 - 37 U/L    Alk. phosphatase 97 45 - 117 U/L    Protein, total 7.9 6.4 - 8.2 g/dL    Albumin 3.2 (L) 3.5 - 5.0 g/dL    Globulin 4.7 (H) 2.0 - 4.0 g/dL    A-G Ratio 0.7 (L) 1.1 - 2.2     PROTHROMBIN TIME + INR    Collection Time: 02/17/22 12:15 AM   Result Value Ref Range    INR 1.0 0.9 - 1.1      Prothrombin time 10.3 9.0 - 11.1 sec   PTT    Collection Time: 02/17/22 12:15 AM   Result Value Ref Range    aPTT 26.0 22.1 - 31.0 sec    aPTT, therapeutic range     58.0 - 77.0 SECS         Imaging:     No results found. CTA/ CT    IMPRESSION     Nonvisualization of the right vertebral artery may be due to extremely  diminutive caliber.  Slightly suboptimal bolus timing. No other evidence for any  arterial occlusion or significant stenosis. No evidence for intracranial  aneurysm.     Severe sinus disease.     Several borderline enlarged anterior mediastinal lymph nodes of uncertain  significance. Nonspecific nodular opacities in the lung apices. Recommend  follow-up CT of the chest in 1-3 months.       Admit to inpatient status      Patient was explained about the risk of admission including and not a complete list including risk of falls,fractures,blood clots,allergic reactions,infections. Patient/family also understands and agrees to the treatment plan including medications and side effect profiles and also understand the risk with radiation while undergoing imaging studies. The patient and the family/friends (after permission given by the patient to discuss) understand this and agree with the admission plan.         Assessment:     Principal Problem:    TIA (transient ischemic attack) (2/17/2022)           Plan:     TIA like symptoms POA  --CTA showing nonvisualization of the right vertebral artery may be due to occlusion and neuro interventional surgery was contacted from [address] [place] [address] and patient was transferred here at [facility] for further management patient was loaded with aspirin Plavix and NIS consulted  --Permissive hypertension  --Neuro check every 2 hours  --Admit to neuro intermediate care  --Routine TIA work-up with A1c echocardiogram lipid panel  --P2 Y 12 test and aspirin test  --Further management and follow-up as per neuro interventional surgery    Full code ambulates at baseline expected to discharge from the hospital in 48 hours       Signed By: Que Nguyen MD     February 17, 2022

## 2022-02-17 NOTE — ROUTINE PROCESS
TRANSFER - IN REPORT:    Verbal report received from Andrea(name) on [employer].  being received from ED(unit) for routine progression of care      Report consisted of patients Situation, Background, Assessment and   Recommendations(SBAR). Information from the following report(s) SBAR, ED Summary, Procedure Summary, MAR, Recent Results and Cardiac Rhythm NSR was reviewed with the receiving nurse. Opportunity for questions and clarification was provided. Assessment completed upon patients arrival to unit and care assumed.

## 2022-02-18 ENCOUNTER — APPOINTMENT (OUTPATIENT)
Dept: GENERAL RADIOLOGY | Age: 32
DRG: 069 | End: 2022-02-18
Attending: NURSE PRACTITIONER

## 2022-02-18 ENCOUNTER — APPOINTMENT (OUTPATIENT)
Dept: NON INVASIVE DIAGNOSTICS | Age: 32
DRG: 069 | End: 2022-02-18
Attending: NURSE PRACTITIONER
Payer: COMMERCIAL

## 2022-02-18 VITALS
BODY MASS INDEX: 44.1 KG/M2 | WEIGHT: 315 LBS | TEMPERATURE: 97.3 F | RESPIRATION RATE: 11 BRPM | DIASTOLIC BLOOD PRESSURE: 68 MMHG | HEIGHT: 71 IN | HEART RATE: 9 BPM | OXYGEN SATURATION: 100 % | SYSTOLIC BLOOD PRESSURE: 127 MMHG

## 2022-02-18 DIAGNOSIS — I65.01 OCCLUSION OF RIGHT VERTEBRAL ARTERY: Primary | ICD-10-CM

## 2022-02-18 PROBLEM — G45.9 TIA (TRANSIENT ISCHEMIC ATTACK): Status: RESOLVED | Noted: 2022-02-17 | Resolved: 2022-02-18

## 2022-02-18 LAB
ANION GAP SERPL CALC-SCNC: 7 MMOL/L (ref 5–15)
BUN SERPL-MCNC: 14 MG/DL (ref 6–20)
BUN/CREAT SERPL: 19 (ref 12–20)
CALCIUM SERPL-MCNC: 9.1 MG/DL (ref 8.5–10.1)
CHLORIDE SERPL-SCNC: 104 MMOL/L (ref 97–108)
CO2 SERPL-SCNC: 25 MMOL/L (ref 21–32)
CREAT SERPL-MCNC: 0.75 MG/DL (ref 0.7–1.3)
ECHO AV PEAK GRADIENT: 8 MMHG
ECHO AV PEAK VELOCITY: 1.4 M/S
ECHO AV VELOCITY RATIO: 0.71
ECHO LVOT PEAK GRADIENT: 4 MMHG
ECHO LVOT PEAK VELOCITY: 1 M/S
ECHO MV A VELOCITY: 0.63 M/S
ECHO MV E VELOCITY: 0.85 M/S
ECHO MV E/A RATIO: 1.35
GLUCOSE SERPL-MCNC: 86 MG/DL (ref 65–100)
MAGNESIUM SERPL-MCNC: 2.1 MG/DL (ref 1.6–2.4)
PHOSPHATE SERPL-MCNC: 4.1 MG/DL (ref 2.6–4.7)
POTASSIUM SERPL-SCNC: 4 MMOL/L (ref 3.5–5.1)
SODIUM SERPL-SCNC: 136 MMOL/L (ref 136–145)

## 2022-02-18 PROCEDURE — 74011250637 HC RX REV CODE- 250/637: Performed by: NURSE PRACTITIONER

## 2022-02-18 PROCEDURE — 36415 COLL VENOUS BLD VENIPUNCTURE: CPT

## 2022-02-18 PROCEDURE — 97165 OT EVAL LOW COMPLEX 30 MIN: CPT

## 2022-02-18 PROCEDURE — 80048 BASIC METABOLIC PNL TOTAL CA: CPT

## 2022-02-18 PROCEDURE — 97161 PT EVAL LOW COMPLEX 20 MIN: CPT

## 2022-02-18 PROCEDURE — 71045 X-RAY EXAM CHEST 1 VIEW: CPT

## 2022-02-18 PROCEDURE — 74011250637 HC RX REV CODE- 250/637: Performed by: HOSPITALIST

## 2022-02-18 PROCEDURE — 83735 ASSAY OF MAGNESIUM: CPT

## 2022-02-18 PROCEDURE — 84100 ASSAY OF PHOSPHORUS: CPT

## 2022-02-18 PROCEDURE — 93306 TTE W/DOPPLER COMPLETE: CPT | Performed by: SPECIALIST

## 2022-02-18 PROCEDURE — 93306 TTE W/DOPPLER COMPLETE: CPT

## 2022-02-18 PROCEDURE — 93005 ELECTROCARDIOGRAM TRACING: CPT

## 2022-02-18 PROCEDURE — 97530 THERAPEUTIC ACTIVITIES: CPT

## 2022-02-18 RX ORDER — ATORVASTATIN CALCIUM 80 MG/1
80 TABLET, FILM COATED ORAL DAILY
Qty: 30 TABLET | Refills: 0 | Status: SHIPPED | OUTPATIENT
Start: 2022-02-19 | End: 2022-03-21

## 2022-02-18 RX ORDER — GUAIFENESIN 100 MG/5ML
81 LIQUID (ML) ORAL DAILY
Qty: 30 TABLET | Refills: 0 | Status: SHIPPED | OUTPATIENT
Start: 2022-02-19 | End: 2022-03-21

## 2022-02-18 RX ADMIN — ASPIRIN 81 MG 81 MG: 81 TABLET ORAL at 08:41

## 2022-02-18 RX ADMIN — TICAGRELOR 60 MG: 60 TABLET ORAL at 01:09

## 2022-02-18 RX ADMIN — AMLODIPINE BESYLATE 5 MG: 5 TABLET ORAL at 08:41

## 2022-02-18 RX ADMIN — ATORVASTATIN CALCIUM 80 MG: 40 TABLET, FILM COATED ORAL at 08:41

## 2022-02-18 RX ADMIN — TICAGRELOR 60 MG: 60 TABLET ORAL at 13:08

## 2022-02-18 RX ADMIN — ACETAMINOPHEN 650 MG: 325 TABLET ORAL at 00:05

## 2022-02-18 NOTE — CONSULTS
CARDIOLOGY CONSULT                  Subjective:    Date of  Admission:   Admission type:Emergency    Jazzy Welch MD     This is a 27 yom with no cardiac history here with TIA. While on monitor, he had a 3.8 second pause while asleep overnight. He has had some shorter pauses also. He was evaluated afterwards and was asymptomatic. He has not had any symptoms concerning for significant conduction disease while admitted or previously. 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 Patient Active Problem List   Diagnosis Code    TIA (transient ischemic attack) G45.9        PMH - no cardiac  PSH - no cardiac  FH - no cardiac  Social History     Socioeconomic History    Marital status:      Spouse name: Not on file    Number of children: Not on file    Years of education: Not on file    Highest education level: Not on file   Occupational History    Not on file   Tobacco Use    Smoking status: Never Smoker    Smokeless tobacco: Never Used   Vaping Use    Vaping Use: Never used   Substance and Sexual Activity    Alcohol use: Yes     Comment: weekends    Drug use: Yes     Types: Marijuana    Sexual activity: Not on file   Other Topics Concern    Not on file   Social History Narrative    Not on file     Social Determinants of Health     Financial Resource Strain:     Difficulty of Paying Living Expenses: Not on file   Food Insecurity:     Worried About Running Out of Food in the Last Year: Not on file    Jacqueline of Food in the Last Year: Not on file   Transportation Needs:     Lack of Transportation (Medical): Not on file    Lack of Transportation (Non-Medical):  Not on file   Physical Activity:     Days of Exercise per Week: Not on file    Minutes of Exercise per Session: Not on file   Stress:     Feeling of Stress : Not on file   Social Connections:     Frequency of Communication with Friends and Family: Not on file    Frequency of Social Gatherings with Friends and Family: Not on file    Attends Jain Services: Not on file    Active Member of Clubs or Organizations: Not on file    Attends Club or Organization Meetings: Not on file    Marital Status: Not on file   Intimate Partner Violence:     Fear of Current or Ex-Partner: Not on file    Emotionally Abused: Not on file    Physically Abused: Not on file    Sexually Abused: Not on file   Housing Stability:     Unable to Pay for Housing in the Last Year: Not on file    Number of Places Lived in the Last Year: Not on file    Unstable Housing in the Last Year: Not on file        Current Facility-Administered Medications   Medication Dose Route Frequency    acetaminophen (TYLENOL) tablet 650 mg  650 mg Oral Q4H PRN    Or    acetaminophen (TYLENOL) solution 650 mg  650 mg Per NG tube Q4H PRN    Or    acetaminophen (TYLENOL) suppository 650 mg  650 mg Rectal Q4H PRN    aspirin chewable tablet 81 mg  81 mg Oral DAILY    ticagrelor (BRILINTA) tablet 60 mg  60 mg Oral Q12H    amLODIPine (NORVASC) tablet 5 mg  5 mg Oral DAILY    atorvastatin (LIPITOR) tablet 80 mg  80 mg Oral DAILY             Review of Symptoms:    Gen - no F/C/S  Eyes - no vision changes  ENT - no sore throat, rhinorrhea, otalgia  CV - no CP, no palpitations, no orthopnea, no PND, no ROBERTH  Resp no cough, no SOB/GOOD  GI - no AP, no n/v/d/c   - no dysuria, no hematuria  MSK - no abnormal joint pains  Skin - no rashes  Neuro - no HA, no numbness, no weakness, no slurred speech  Psych - no change in mood       Physical Exam    BP (!) 144/85 (BP 1 Location: Right upper arm)   Pulse 72   Temp 98 °F (36.7 °C)   Resp 15   Ht 5' 11\" (1.803 m)   Wt (!) 186.9 kg (412 lb)   SpO2 99%   BMI 57.46 kg/m²      NAD  Skin warm and dry  Nl conjunctiva  Oropharynx without exudate.     Neck supple  Lungs clear  Normal S1/ S2 with occasional ectopy  No Murmurs, click or Rubs  Abdomen soft and non tender  Pulses 2+ radials  Neuro:  Grossly intact  Appropriate Cardiographics    Telemetry: normal sinus rhythm  ECG: normal sinus rhythm  Echocardiogram: pending    Assessment: This is a 27 yom with TIA consulted for sinus pause overnight. 3.8 second pause while sleeping overnight is not considered significant. This is likely a function of baseline bradycardia and untreated LIDIA. Amlodipine is not typically considered to affect the conduction system.     Plan:    Cont current cardiac meds  Echo pending  Cont telemetry while admitted  No further OP workup is needed for now, unless clinical conditions change; this pause would not contribute to his TIA    Further recs based on echo, if echo normal, will sign off

## 2022-02-18 NOTE — PROGRESS NOTES
Notified by RN that patient had patient had 3.8 second pause on the monitor. Seen and examined patient. Asymptomatic. States that he is feeling fine. Denies any dizziness, shortness of breath, chest pain or tightness. SB noted when sleeping. Does not have any cardiac history. States he was recently told he has sleep apnea and has been waiting for his CPAP to be delivered   SB noted on monitor. EKG, Chest xray and labs ordered   Pads placed. Cardiology consulted. EKG showing NSR rate 71.

## 2022-02-18 NOTE — ROUTINE PROCESS
Bedside and Verbal shift change report given to [address] [place] [address] Joni (oncoming nurse) by Ana Hilton (offgoing nurse).  Report included the following information SBAR, Kardex, Intake/Output, Recent Results and Cardiac Rhythm SR.

## 2022-02-18 NOTE — PROGRESS NOTES
Problem: Discharge Planning  Goal: *Discharge to safe environment  Outcome: Progressing Towards Goal     Problem: General Medical Care Plan  Goal: *Vital signs within specified parameters  Outcome: Progressing Towards Goal  Goal: *Labs within defined limits  Outcome: Progressing Towards Goal  Goal: *Absence of infection signs and symptoms  Outcome: Progressing Towards Goal  Goal: *Optimal pain control at patient's stated goal  Outcome: Progressing Towards Goal  Goal: *Skin integrity maintained  Outcome: Progressing Towards Goal  Goal: *Fluid volume balance  Outcome: Progressing Towards Goal  Goal: *Optimize nutritional status  Outcome: Progressing Towards Goal  Goal: *Anxiety reduced or absent  Outcome: Progressing Towards Goal  Goal: *Progressive mobility and function (eg: ADL's)  Outcome: Progressing Towards Goal

## 2022-02-18 NOTE — PROGRESS NOTES
PHYSICAL THERAPY EVALUATION/DISCHARGE  Patient: Dereck Pastor. (28 y.o. male)  Date: 2/18/2022  Primary Diagnosis: TIA (transient ischemic attack) [G45.9]       Precautions:         ASSESSMENT  Based on the objective data described below, the patient presents with sensation, ROM, coordination and strength intact and at baseline s/p admission for CVA work up. Imaging was negative for any acute changes. At baseline pt lives with his sister and is independent without the use of DME. Pt reported L sided paresthesia which has resolved. Pt able to ambulate with supervision 20ft. Educated pt on BEFAST acronym for signs/sypmtoms of stroke and provided a handout. Recommending discharge home without additional PT needs, will sign off. Functional Outcome Measure: The patient scored 52/56 on the Rose outcome measure which is indicative of low fall risk . Other factors to consider for discharge: independent, lives with sister, at baseline      Further skilled acute physical therapy is not indicated at this time. PLAN :  Recommendation for discharge: (in order for the patient to meet his/her long term goals)  No skilled physical therapy/ follow up rehabilitation needs identified at this time. This discharge recommendation:  Has not yet been discussed the attending provider and/or case management    IF patient discharges home will need the following DME: none       SUBJECTIVE:   Patient stated I've been getting to and from the bathroom fine.     OBJECTIVE DATA SUMMARY:   HISTORY:    History reviewed. No pertinent past medical history. History reviewed. No pertinent surgical history. Prior level of function:Iindependent without the use of DME.      Home Situation  Home Environment: Private residence  # Steps to Enter: 0  One/Two Story Residence: One story  Living Alone: No  Support Systems:  (lives with sister)  Patient Expects to be Discharged to[de-identified] Home  Current DME Used/Available at Home: None  Tub or Shower Type: Shower    EXAMINATION/PRESENTATION/DECISION MAKING:   Critical Behavior:  Neurologic State: Alert  Orientation Level: Oriented X4  Cognition: Appropriate decision making,Appropriate for age attention/concentration,Appropriate safety awareness,Follows commands  Safety/Judgement: Awareness of environment,Fall prevention,Good awareness of safety precautions,Home safety,Insight into deficits  Hearing: Auditory  Auditory Impairment: None    Range Of Motion:  AROM: Within functional limits (Simultaneous filing. User may not have seen previous data.)           PROM: Within functional limits (Simultaneous filing. User may not have seen previous data.)           Strength:    Strength: Within functional limits (Simultaneous filing. User may not have seen previous data.)                    Tone & Sensation:   Tone: Normal (Simultaneous filing. User may not have seen previous data.)              Sensation: Intact (Simultaneous filing. User may not have seen previous data.)               Coordination:  Coordination: Within functional limits (Simultaneous filing. User may not have seen previous data.)  Vision:   Tracking: Able to track stimulus in all quadrants w/o difficulty  Diplopia: No  Acuity: Within Defined Limits  Corrective Lenses: Glasses  Functional Mobility:  Bed Mobility:     Supine to Sit: Independent  Sit to Supine:  Independent  Scooting: Independent   Transfers:  Sit to Stand: Independent  Stand to Sit: Independent        Bed to Chair: Independent              Balance:   Sitting: Intact (Simultaneous filing. User may not have seen previous data.)  Standing: Intact (Simultaneous filing.  User may not have seen previous data.)  Ambulation/Gait Training:  Distance (ft): 20 Feet (ft)  Assistive Device: Gait belt  Ambulation - Level of Assistance: Independent    Functional Measure:  Rose Balance Test:    Sitting to Standin  Standing Unsupported: 4  Sitting with Back Unsupported: 4  Standing to Sitting: 4  Transfers: 4  Standing Unsupported with Eyes Closed: 4  Standing Unsupported with Feet Together: 3  Reach Forward with Outstretched Arm: 3   Object: 4  Turn to Look Over Shoulders: 4  Turn 360 Degrees: 4  Alternate Foot on Step/Stool: 4  Standing Unsupported One Foot in Front: 3  Stand on One Leg: 3  Total: 52/56         56=Maximum possible score;   0-20=High fall risk  21-40=Moderate fall risk   41-56=Low fall risk          Physical Therapy Evaluation Charge Determination   History Examination Presentation Decision-Making   MEDIUM  Complexity : 1-2 comorbidities / personal factors will impact the outcome/ POC  HIGH Complexity : 4+ Standardized tests and measures addressing body structure, function, activity limitation and / or participation in recreation  LOW Complexity : Stable, uncomplicated  Other outcome measures Rose  LOW       Based on the above components, the patient evaluation is determined to be of the following complexity level: LOW       Activity Tolerance:   Good      After treatment patient left in no apparent distress:   Sitting in chair and Call bell within reach    COMMUNICATION/EDUCATION:   The patients plan of care was discussed with: Occupational therapist and Registered nurse. Educated pt on BEFAST acronym for signs/sypmtoms of stroke and provided a handout. Fall prevention education was provided and the patient/caregiver indicated understanding., Patient/family have participated as able in goal setting and plan of care. and Patient/family agree to work toward stated goals and plan of care. Thank you for this referral.  Russell Cabrera, SPT   Time Calculation: 13 mins        Regarding student involvement in patient care:  A student participated in this treatment session. Per CMS Medicare statements and APTA guidelines I certify that the following was true:  1. I was present and directly observed the entire session.   2. I made all skilled judgments and clinical decisions regarding care. 3. I am the practitioner responsible for assessment, treatment, and documentation.

## 2022-02-18 NOTE — PROGRESS NOTES
NIS Brief Progress Note    Rounded on pt this am with Dr. Meryle Flight. His insurance does not start until 3/1/22. Discussed with CM and pt given discount card to get 1 month of Brilinta for free. OK to discharge from NIS standpoint. Follow up in [facility] clinic with Dr. Meryle Flight in 3 months. Pt will need to have a repeat CTA head/neck prior to this appt. Orders placed and this was discussed with the pt. 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 Meryle Flight.      Edward Murcia NP  NIS Nurse Practitioner

## 2022-02-18 NOTE — ROUTINE PROCESS
Message sent to Miguel Xavier. Mr Katelin Almonte is here for tia. Pt had a 3.8 second pause. pt was sleeping. asymptomatic. SB(40's-50's) when sleeping. bp 112/55. will continue to monitor unless any new orders?

## 2022-02-18 NOTE — PROGRESS NOTES
Followed up on P2y12 results after Brilinta which is therapeutic at 135. LDL elevated at 137.2. Started on high intensity statin. pending echo. Patient remains neuro intact and denied any new neurological symptoms.

## 2022-02-18 NOTE — PROGRESS NOTES
Spiritual Care Partner Volunteer visited patient in [place] on 2.18.22.     Documented by Darcie Wells, Staff , on 2.18.22  Please call KENIA [phone]) to page  if needed

## 2022-02-18 NOTE — ROUTINE PROCESS
Bedside and Verbal shift change report given to rainer Welsh (oncoming nurse) by Chandana Arevalo (offgoing nurse). Report included the following information SBAR, Kardex, Intake/Output, Recent Results and Cardiac Rhythm SR/SB.

## 2022-02-18 NOTE — PROGRESS NOTES
Physical Therapy:     Order received, chart reviewed, pt evaluated by PT. Patient is at his mobility baseline and has no further PT needs. Full eval to follow.     Devin Bustillos, PT, DPT

## 2022-02-18 NOTE — ROUTINE PROCESS
Bedside shift change report given to Josesito Kam (oncoming nurse) by Birgit Talbot (offgoing nurse). Report included the following information SBAR, ED Summary, Procedure Summary and MAR.

## 2022-02-18 NOTE — PROGRESS NOTES
Problem: General Medical Care Plan  Goal: *Vital signs within specified parameters  Outcome: Progressing Towards Goal  Goal: *Labs within defined limits  Outcome: Progressing Towards Goal     Problem: Falls - Risk of  Goal: *Absence of Falls  Description: Document Reymundo Fall Risk and appropriate interventions in the flowsheet. Outcome: Progressing Towards Goal  Note: Fall Risk Interventions:call bell within reach.                        History of Falls Interventions: Bed/chair exit alarm,Room close to nurse's station         Problem: Patient Education: Go to Patient Education Activity  Goal: Patient/Family Education  Outcome: Progressing Towards Goal

## 2022-02-18 NOTE — PROGRESS NOTES
OCCUPATIONAL THERAPY EVALUATION/DISCHARGE  Patient: Torie Oliver (28 y.o. male)  Date: 2/18/2022  Primary Diagnosis: TIA (transient ischemic attack) [G45.9]       Precautions: Fall       ASSESSMENT  Based on the objective data described below, the patient presents with return to baseline function s/p admission for L sided numbness with TIA workup, imaging negative for acute process but indicating severe paranasal sinus dx. He is IND & working at baseline, has returned to beind IND with ADLs and mobility with complete resolution of L sided numbness. No further acute OT needs, BE FAST reviewed, cleared for d/c from OT standpoint. Current Level of Function (ADLs/self-care): IND for ADLs and mobility    Functional Outcome Measure: The patient scored 100/100 on the Barthel Index outcome measure which is indicative of being independent in basic self care. Other factors to consider for discharge: none     PLAN :  Recommend with staff: up ad anthony to bathroom    Recommendation for discharge: (in order for the patient to meet his/her long term goals)  No skilled occupational therapy/ follow up rehabilitation needs identified at this time. This discharge recommendation:  A follow-up discussion with the attending provider and/or case management is planned    IF patient discharges home will need the following DME: none       SUBJECTIVE:   Patient stated I feel fine now.     OBJECTIVE DATA SUMMARY:   HISTORY:   History reviewed. No pertinent past medical history. History reviewed. No pertinent surgical history.     Prior Level of Function/Environment/Context:   Expanded or extensive additional review of patient history:   Home Situation  Home Environment: Private residence  # Steps to Enter: 0  One/Two Story Residence: One story  Living Alone: No  Support Systems:  (lives with sister)  Patient Expects to be Discharged to[de-identified] Home  Current DME Used/Available at Home: None  Tub or Shower Type: Shower    Hand dominance: Right    EXAMINATION OF PERFORMANCE DEFICITS:  Cognitive/Behavioral Status:  Neurologic State: Alert  Orientation Level: Oriented X4  Cognition: Appropriate decision making; Appropriate for age attention/concentration; Appropriate safety awareness; Follows commands  Perception: Appears intact  Perseveration: No perseveration noted  Safety/Judgement: Awareness of environment; Fall prevention;Good awareness of safety precautions; Home safety; Insight into deficits    Skin: Appears intact    Edema: None noted    Hearing: Auditory  Auditory Impairment: None    Vision/Perceptual:    Tracking: Able to track stimulus in all quadrants w/o difficulty                 Diplopia: No    Acuity: Within Defined Limits    Corrective Lenses: Glasses    Range of Motion:  AROM: Within functional limits  PROM: Within functional limits                      Strength:  Strength: Within functional limits                 Coordination:  Coordination: Within functional limits   Fine Motor Skills-Upper: Left Intact; Right Intact    Gross Motor Skills-Upper: Left Intact; Right Intact    Tone & Sensation:  Tone: Normal   Sensation: Intact                      Balance:  Sitting: Intact   Standing: Intact     Functional Mobility and Transfers for ADLs:  Bed Mobility:  Supine to Sit: Independent  Sit to Supine:  (session ended in chair)  Scooting: Independent     Transfers:  Sit to Stand: Independent  Stand to Sit: Independent  Bed to Chair: Independent  Bathroom Mobility: Independent  Toilet Transfer : Independent  Shower Transfer: Independent    ADL Assessment:  Feeding: Independent    Oral Facial Hygiene/Grooming: Independent    Bathing: Independent    Upper Body Dressing: Independent    Lower Body Dressing: Independent    Toileting: Independent                ADL Intervention and task modifications:    Lower Body Dressing Assistance  Dressing Assistance: Modified independent  Pants With Elastic Waist: Modified independent  Socks: Modified independent  Leg Crossed Method Used: No  Position Performed: Bending forward method;Seated edge of bed;Standing         Cognitive Retraining  Safety/Judgement: Awareness of environment; Fall prevention;Good awareness of safety precautions; Home safety; Insight into deficits      Functional Measure:    Barthel Index:  Bathin  Bladder: 10  Bowels: 10  Groomin  Dressing: 10  Feeding: 10  Mobility: 15  Stairs: 10  Toilet Use: 10  Transfer (Bed to Chair and Back): 15  Total: 100/100      The Barthel ADL Index: Guidelines  1. The index should be used as a record of what a patient does, not as a record of what a patient could do. 2. The main aim is to establish degree of independence from any help, physical or verbal, however minor and for whatever reason. 3. The need for supervision renders the patient not independent. 4. A patient's performance should be established using the best available evidence. Asking the patient, friends/relatives and nurses are the usual sources, but direct observation and common sense are also important. However direct testing is not needed. 5. Usually the patient's performance over the preceding 24-48 hours is important, but occasionally longer periods will be relevant. 6. Middle categories imply that the patient supplies over 50 per cent of the effort. 7. Use of aids to be independent is allowed. Score Interpretation (from [address] [address])    Independent   60-79 Minimally independent   40-59 Partially dependent   20-39 Very dependent   <20 Totally dependent     -Mati Mock., Barthel, D.W. (1965). Functional evaluation: the Barthel Index. [address] [facility] ([address] (). The Barthel activities of daily living index: self-reporting versus actual performance in the old (> or = 75 years). Journal of [address] [place] [address] 45(7), [facility], .WilliamWilliam, Leland Cowden., Dony Christopher. (1999).  Measuring the change in disability after inpatient rehabilitation; comparison of the responsiveness of the Barthel Index and Functional [place] Measure. Journal of Neurology, Neurosurgery, and Psychiatry, 66(4), 933-521. CAROLINE Cage.A, BRANDY Maharaj, & Reza Domínguez M.A. (2004) Assessment of post-stroke quality of life in cost-effectiveness studies: The usefulness of the Barthel Index and the EuroQoL-5D. Quality of Life Research, 15, 715-82         Occupational Therapy Evaluation Charge Determination   History Examination Decision-Making   LOW Complexity : Brief history review  LOW Complexity : 1-3 performance deficits relating to physical, cognitive , or psychosocial skils that result in activity limitations and / or participation restrictions  LOW Complexity : No comorbidities that affect functional and no verbal or physical assistance needed to complete eval tasks       Based on the above components, the patient evaluation is determined to be of the following complexity level: LOW   Pain Rating:  None    Activity Tolerance: WNL    After treatment patient left in no apparent distress:    Sitting in chair and Call bell within reach    COMMUNICATION/EDUCATION:   The patients plan of care was discussed with: Physical therapist and Registered nurse.      Thank you for this referral.  ZAFAR Bravo, OTR/L  Time Calculation: 8 mins

## 2022-02-18 NOTE — DISCHARGE INSTRUCTIONS
Discharge Instructions       PATIENT ID: Hillary Hernandez MRN: [id number]   YOB: 1990    DATE OF ADMISSION: 2/17/2022  6:16 AM    DATE OF DISCHARGE: 2/18/2022    PRIMARY CARE PROVIDER: Lowell Starks MD     ATTENDING PHYSICIAN: Macario Mayo MD  DISCHARGING PROVIDER: Chevy Daniel MD    To contact this individual call [phone] and ask the  to page. If unavailable ask to be transferred the Adult Hospitalist Department. DISCHARGE DIAGNOSES TIA/     CONSULTATIONS: IP CONSULT TO NEUROINTERVENTIONAL SURGERY  IP CONSULT TO NEUROLOGY  IP CONSULT TO CARDIOLOGY    PROCEDURES/SURGERIES: * No surgery found *    PENDING TEST RESULTS:   At the time of discharge the following test results are still pending:     FOLLOW UP APPOINTMENTS:   Follow-up Information     Follow up With Specialties Details Why Benigno Piña MD Internal Medicine In 1 week  [address]  [phone]             ADDITIONAL CARE RECOMMENDATIONS:     DIET: Cardiac Diet    ACTIVITY: Activity as tolerated    WOUND CARE:     EQUIPMENT needed:       Radiology      DISCHARGE MEDICATIONS:   See Medication Reconciliation Form    · It is important that you take the medication exactly as they are prescribed. · Keep your medication in the bottles provided by the pharmacist and keep a list of the medication names, dosages, and times to be taken in your wallet. · Do not take other medications without consulting your doctor. NOTIFY YOUR PHYSICIAN FOR ANY OF THE FOLLOWING:   Fever over 101 degrees for 24 hours. Chest pain, shortness of breath, fever, chills, nausea, vomiting, diarrhea, change in mentation, falling, weakness, bleeding. Severe pain or pain not relieved by medications. Or, any other signs or symptoms that you may have questions about.       DISPOSITION:  x  Home With:   OT  PT  HH  RN       SNF/Inpatient Rehab/LTAC    Independent/assisted living Hospice    Other:     CDMP Checked:   Yes x     PROBLEM LIST Updated:  Yes x       Signed:   Narcisa Swenson MD  2/18/2022  10:42 AM

## 2022-02-18 NOTE — PROGRESS NOTES
Attempted to schedule hospital follow up PCP appointment. Office nurse will contact the patient with appointment information as there are no available appointments within the required time frames.   Cherry Cooper, Care Management Specialist.

## 2022-02-18 NOTE — PROGRESS NOTES
[address] [facility] Adult  Hospitalist Group                                                                                          Hospitalist Progress Note  Elfego Montaño MD  Answering service: [phone] from in house phone        Date of Service:  2022  NAME:  William Bagley. :  1990  MRN:  [id number]      Admission Summary:   William Dyson is a 27 y.o. male who presents with  4 days of L thumb tingling sensation for the last 4 days. Patient also had lower extremity since tingling sensation that progressed throughout the day yesterday and came to the emergency department of [facility]. There they did some preliminary work-up and showing left vertebral artery occlusion and thereafter neuro interventional surgery was consulted and decided to transfer to [facility] for further management stat MRI was ordered patient was loaded with aspirin Plavix.     Interval history / Subjective:   Seen and examined at baseline work-up pending echocardiogram seen by neurology and neuro interventional surgery     Assessment & Plan:     TIA like symptoms POA  -- CT/CTA/ MRI -no acute stroke seen there was questionable vertebral artery occlusion followed by MRI and neuro interventional surgery reviewed the films and MRI currently patient is on dual antiplatelets  --P2 Y 12 was not suppressed even after Plavix loading so changed to aspirin and Brilinta  --Lipid panel reviewed patient on statin awaiting echocardiogram    Hypertension started on amlodipine currently blood pressure normal  Pauses 3 seconds on telemetry--awaiting echocardiogram patient has symptomatic May discontinue calcium channel blocker which is started recently yesterday  Lung nodule seen in the CT scan 1 to 3 mm repeat CT scan in 1 to 3 months recommended  Severe sinus disease--May need outpatient ENT follow-up WBC count normal no fever  But his tenia lymph node enlarged--follow-up in the outpatient with a repeat CT scan as above  CPAP- will f/u with the delivery as per pt request CM consult     Code status: Full  DVT prophylaxis: SCD    Care Plan discussed with: Patient/Family and Nurse  Anticipated Disposition: Home w/Family  Anticipated Discharge: Greater than 48 hours     Hospital Problems  Date Reviewed: 2/17/2022          Codes Class Noted POA    * (Principal) TIA (transient ischemic attack) ICD-10-CM: G45.9  ICD-9-CM: 435.9  2/17/2022 Unknown                Review of Systems:   A comprehensive review of systems was negative. Vital Signs:    Last 24hrs VS reviewed since prior progress note.  Most recent are:  Visit Vitals  BP (!) 129/59   Pulse (!) 49   Temp 97.7 °F (36.5 °C)   Resp 10   Ht 5' 11\" (1.803 m)   Wt (!) 186.9 kg (412 lb)   SpO2 98%   BMI 57.46 kg/m²       No intake or output data in the 24 hours ending 02/18/22 1010     Physical Examination:     I had a face to face encounter with this patient and independently examined them on 2/18/2022 as outlined below:          General : alert x 3, awake, no acute distress, resting in bed  HEENT: PEERL, EOMI, moist mucus membrane, TM clear  Neck: supple, no JVD, no meningeal signs  Chest: Clear to auscultation bilaterally   CVS: S1 S2 heard, Capillary refill less than 2 seconds  Abd: soft/ Non tender, non distended, BS physiological,   Ext: no clubbing, no cyanosis, no edema, brisk 2+ DP pulses  Neuro/Psych: pleasant mood and affect, CN 2-12 grossly intact  Skin: warm     Data Review:    I personally reviewed  Image and labs      Labs:     Recent Labs     02/16/22 2120   WBC 5.1   HGB 12.5   HCT 39.4        Recent Labs     02/18/22  0406 02/16/22 2120    138   K 4.0 4.0    105   CO2 25 29   BUN 14 17   CREA 0.75 1.09   GLU 86 84   CA 9.1 9.2   MG 2.1  --    PHOS 4.1  --      Recent Labs     02/16/22 2120   ALT 73   AP 97   TBILI 0.4   TP 7.9   ALB 3.2*   GLOB 4.7*     Recent Labs     02/17/22  0015   INR 1.0   PTP 10. 3   APTT 26.0      No results for input(s): FE, TIBC, PSAT, FERR in the last 72 hours. No results found for: FOL, RBCF   No results for input(s): PH, PCO2, PO2 in the last 72 hours. No results for input(s): CPK, CKNDX, TROIQ in the last 72 hours. No lab exists for component: CPKMB  Lab Results   Component Value Date/Time    Cholesterol, total 228 (H) 02/17/2022 10:04 PM    HDL Cholesterol 54 02/17/2022 10:04 PM    LDL, calculated 137.2 (H) 02/17/2022 10:04 PM    Triglyceride 184 (H) 02/17/2022 10:04 PM    CHOL/HDL Ratio 4.2 02/17/2022 10:04 PM     No results found for: GLUCPOC  No results found for: COLOR, APPRN, SPGRU, REFSG, BENITO, PROTU, GLUCU, KETU, BILU, UROU, MEERA, LEUKU, GLUKE, EPSU, BACTU, WBCU, RBCU, CASTS, UCRY      Medications Reviewed:     Current Facility-Administered Medications   Medication Dose Route Frequency    acetaminophen (TYLENOL) tablet 650 mg  650 mg Oral Q4H PRN    Or    acetaminophen (TYLENOL) solution 650 mg  650 mg Per NG tube Q4H PRN    Or    acetaminophen (TYLENOL) suppository 650 mg  650 mg Rectal Q4H PRN    aspirin chewable tablet 81 mg  81 mg Oral DAILY    ticagrelor (BRILINTA) tablet 60 mg  60 mg Oral Q12H    amLODIPine (NORVASC) tablet 5 mg  5 mg Oral DAILY    atorvastatin (LIPITOR) tablet 80 mg  80 mg Oral DAILY     ______________________________________________________________________  EXPECTED LENGTH OF STAY: - - -  ACTUAL LENGTH OF STAY:          1      Please note that this dictation was completed with GOWEX, the computer voice recognition software. Quite often unanticipated grammatical, syntax, homophones, and other interpretive errors are inadvertently transcribed by the computer software. Please disregard these errors. Please excuse any errors that have escaped final proofreading.                 Bharath Guillaume MD

## 2022-02-18 NOTE — PROGRESS NOTES
Transition of Care Plan   RUR- Low  9%   DISPOSITION: Home when stable   F/U with PCP/Specialist     Transport: [facility]    CM received consult to follow up with patient on CPAP that was ordered. Patient stated that CPAP was ordered through [facility] PCP, CM encouraged him to follow up with PCP, CM unsuccessful to reach Dr. Rashid Birmingham. In addition, patient will need assistance with Brilinta Rx at discharge. CM spoke with patient at bedside to confirm if patient had active insurance policy. Patient confirmed his plan is [employer], but will not be active until 3/1/22. Neuro IR is checking into Brilinta sample for patient. If they are not able to secure sample, CM to request for medication assistance. CM provided patient with Brilinta coupon for commercial insurance for patient to use once his coverage kicks in 3/1.    2:17pm: CM provided patient with Brilinta coupon at the bedside. No further CM needs. Jasmin Barnes M.S.INDIGO.

## 2022-02-19 LAB
ATRIAL RATE: 71 BPM
CALCULATED P AXIS, ECG09: 76 DEGREES
CALCULATED R AXIS, ECG10: 37 DEGREES
CALCULATED T AXIS, ECG11: 31 DEGREES
DIAGNOSIS, 93000: NORMAL
P-R INTERVAL, ECG05: 190 MS
Q-T INTERVAL, ECG07: 428 MS
QRS DURATION, ECG06: 88 MS
QTC CALCULATION (BEZET), ECG08: 465 MS
VENTRICULAR RATE, ECG03: 71 BPM

## 2022-02-21 NOTE — DISCHARGE SUMMARY
Discharge Summary       PATIENT ID: Torie Singh. MRN: [id number]   YOB: 1990    DATE OF ADMISSION: 2/17/2022  6:16 AM    DATE OF DISCHARGE: 2/21/22   PRIMARY CARE PROVIDER: Nikos Peres MD     ATTENDING PHYSICIAN: Mark Barnes  DISCHARGING PROVIDER: Juliocesar Owens MD    To contact this individual call [phone] and ask the  to page. If unavailable ask to be transferred the Adult Hospitalist Department. CONSULTATIONS: IP CONSULT TO NEUROINTERVENTIONAL SURGERY  IP CONSULT TO NEUROLOGY  IP CONSULT TO CARDIOLOGY    PROCEDURES/SURGERIES: * No surgery found *    DISCHARGE DIAGNOSES: TIA    Kristin Max Jr. is a 27 y. o. male who presents with  4 days of L thumb tingling sensation for the last 4 days.  Patient also had lower extremity since tingling sensation that progressed throughout the day yesterday and came to the emergency department of [facility] they did some preliminary work-up and showing left vertebral artery occlusion and thereafter neuro interventional surgery was consulted and decided to transfer to [facility] for further management stat MRI was ordered patient was loaded with aspirin Plavix.     [address] [facility],[address] COURSE:   Patient was admitted for TIA-like symptoms CT CTA MRI no acute stroke seen but there was a questionable vertebral artery occlusion seen by neuro interventional surgery and place the patient on aspirin Plavix P2 Y 12 test was subtherapeutic even with Plavix load so changed to aspirin and Brilinta for hypertension patient was started on amlodipine as well patient had a lung load dual which is 3 mm a 1 to 3 months follow-up was recommended he also had 3 seconds pauses on the telemetry so was seen by cardiology and echocardiogram was within normal limit and there was no further testing recommended patient newly had a CPAP and the delivery assistance was provided by the  discharged home follow-up with PCP    DISCHARGE DIAGNOSES / PLAN:      D/c home    BMI: Body mass index is 57.46 kg/m². . This patient: Meets criteria for severe obesity given BMI >/= to 40 due to excess calories/nutritional. Weight loss and lifestyle modifications should be encouraged as an outpatient. PENDING TEST RESULTS:   At the time of discharge the following test results are still pending:      ADDITIONAL CARE RECOMMENDATIONS:        NOTIFY YOUR PHYSICIAN FOR ANY OF THE FOLLOWING:   Fever over 101 degrees for 24 hours. Chest pain, shortness of breath, fever, chills, nausea, vomiting, diarrhea, change in mentation, falling, weakness, bleeding. Severe pain or pain not relieved by medications, as well as any other signs or symptoms that you may have questions about. FOLLOW UP APPOINTMENTS:    Follow-up Information     Follow up With Specialties Details Why Coy Umanzor MD Internal Medicine In 1 week Office nurse will contact you with appointment information. [facility].      Marilyn Veliz MD Endovascular Surgical Neuroradiology In 3 months Please obtain CTA head/neck before this appt. [address] [place] [address]  [phone]               DIET: Cardiac Diet    ACTIVITY: Activity as tolerated    EQUIPMENT needed:     DISCHARGE MEDICATIONS:  Discharge Medication List as of 2/18/2022  4:02 PM      START taking these medications    Details   aspirin 81 mg chewable tablet Take 1 Tablet by mouth daily for 30 days. , Normal, Disp-30 Tablet, R-0      atorvastatin (LIPITOR) 80 mg tablet Take 1 Tablet by mouth daily for 30 days. , Normal, Disp-30 Tablet, R-0      ticagrelor (BRILINTA) 60 mg tab tablet Take 1 Tablet by mouth every twelve (12) hours every twelve (12) hours for 30 days. , Normal, Disp-60 Tablet, R-0         CONTINUE these medications which have NOT CHANGED    Details   albuterol (PROVENTIL HFA, VENTOLIN HFA, PROAIR HFA) 90 mcg/actuation inhaler Take 2 Puffs by inhalation every four (4) hours as needed for Wheezing., Normal, Disp-1 Each, R-0             DISPOSITION:   x Home With:   OT  PT  HH  RN       Long term SNF/Inpatient Rehab    Independent/assisted living    Hospice    Other:       PATIENT CONDITION AT DISCHARGE:     Functional status    Poor     Deconditioned    x Independent      Cognition   x  Lucid     Forgetful     Dementia      Catheters/lines (plus indication)    Andrews     PICC     PEG    x None      Code status   x  Full code     DNR      PHYSICAL EXAMINATION AT DISCHARGE:  General:          Alert, cooperative, no distress, appears stated age. HEENT:           Atraumatic, anicteric sclerae, pink conjunctivae                          No oral ulcers, mucosa moist, throat clear, dentition fair  Neck:               Supple, symmetrical  Lungs:             Clear to auscultation bilaterally. No Wheezing or Rhonchi. No rales. Chest wall:      No tenderness  No Accessory muscle use. Heart:              Regular  rhythm,  No  murmur   No edema  Abdomen:        Soft, non-tender. Not distended. Bowel sounds normal  Extremities:     No cyanosis. No clubbing,                            Skin turgor normal, Capillary refill normal  Skin:                Not pale. Not Jaundiced  No rashes   Psych:             Not anxious or agitated.   Neurologic:      Alert, moves all extremities, answers questions appropriately and responds to commands       CHRONIC MEDICAL DIAGNOSES:  Problem List as of 2/18/2022 Date Reviewed: 2/17/2022          Codes Class Noted - Resolved    * (Principal) RESOLVED: TIA (transient ischemic attack) ICD-10-CM: G45.9  ICD-9-CM: 435.9  2/17/2022 - 2/18/2022              Greater than 30  minutes were spent with the patient on counseling and coordination of care    Signed:   Marley Ag MD  2/21/2022  12:54 PM

## 2022-05-31 ENCOUNTER — TELEPHONE (OUTPATIENT)
Dept: NEUROSURGERY | Age: 32
End: 2022-05-31

## 2022-05-31 NOTE — TELEPHONE ENCOUNTER
Patient stated that he did not have scan completed because his insurance would not cover. He stated that he spoke with someone who told him he would be added to a wait list and would be called once his insurance will cover imaging.

## 2022-08-08 ENCOUNTER — APPOINTMENT (OUTPATIENT)
Dept: GENERAL RADIOLOGY | Age: 32
End: 2022-08-08
Attending: EMERGENCY MEDICINE

## 2022-08-08 LAB
ALBUMIN SERPL-MCNC: 3.3 G/DL (ref 3.5–5)
ALBUMIN/GLOB SERPL: 0.8 {RATIO} (ref 1.1–2.2)
ALP SERPL-CCNC: 92 U/L (ref 45–117)
ALT SERPL-CCNC: 58 U/L (ref 12–78)
ANION GAP SERPL CALC-SCNC: 4 MMOL/L (ref 5–15)
AST SERPL-CCNC: 38 U/L (ref 15–37)
BASOPHILS # BLD: 0.1 K/UL (ref 0–0.1)
BASOPHILS NFR BLD: 1 % (ref 0–1)
BILIRUB SERPL-MCNC: 0.5 MG/DL (ref 0.2–1)
BNP SERPL-MCNC: 45 PG/ML
BUN SERPL-MCNC: 17 MG/DL (ref 6–20)
BUN/CREAT SERPL: 17 (ref 12–20)
CALCIUM SERPL-MCNC: 9.1 MG/DL (ref 8.5–10.1)
CHLORIDE SERPL-SCNC: 105 MMOL/L (ref 97–108)
CO2 SERPL-SCNC: 28 MMOL/L (ref 21–32)
CREAT SERPL-MCNC: 1 MG/DL (ref 0.7–1.3)
DIFFERENTIAL METHOD BLD: NORMAL
EOSINOPHIL # BLD: 0.2 K/UL (ref 0–0.4)
EOSINOPHIL NFR BLD: 4 % (ref 0–7)
ERYTHROCYTE [DISTWIDTH] IN BLOOD BY AUTOMATED COUNT: 14 % (ref 11.5–14.5)
GLOBULIN SER CALC-MCNC: 4.3 G/DL (ref 2–4)
GLUCOSE SERPL-MCNC: 116 MG/DL (ref 65–100)
HCT VFR BLD AUTO: 39.3 % (ref 36.6–50.3)
HGB BLD-MCNC: 12.2 G/DL (ref 12.1–17)
IMM GRANULOCYTES # BLD AUTO: 0 K/UL (ref 0–0.04)
IMM GRANULOCYTES NFR BLD AUTO: 0 % (ref 0–0.5)
LYMPHOCYTES # BLD: 1.4 K/UL (ref 0.8–3.5)
LYMPHOCYTES NFR BLD: 27 % (ref 12–49)
MCH RBC QN AUTO: 26.5 PG (ref 26–34)
MCHC RBC AUTO-ENTMCNC: 31 G/DL (ref 30–36.5)
MCV RBC AUTO: 85.4 FL (ref 80–99)
MONOCYTES # BLD: 0.5 K/UL (ref 0–1)
MONOCYTES NFR BLD: 10 % (ref 5–13)
NEUTS SEG # BLD: 3.1 K/UL (ref 1.8–8)
NEUTS SEG NFR BLD: 58 % (ref 32–75)
NRBC # BLD: 0 K/UL (ref 0–0.01)
NRBC BLD-RTO: 0 PER 100 WBC
PLATELET # BLD AUTO: 281 K/UL (ref 150–400)
PMV BLD AUTO: 9.1 FL (ref 8.9–12.9)
POTASSIUM SERPL-SCNC: 4.2 MMOL/L (ref 3.5–5.1)
PROT SERPL-MCNC: 7.6 G/DL (ref 6.4–8.2)
RBC # BLD AUTO: 4.6 M/UL (ref 4.1–5.7)
SODIUM SERPL-SCNC: 137 MMOL/L (ref 136–145)
TROPONIN-HIGH SENSITIVITY: 8 NG/L (ref 0–76)
WBC # BLD AUTO: 5.4 K/UL (ref 4.1–11.1)

## 2022-08-08 PROCEDURE — 80053 COMPREHEN METABOLIC PANEL: CPT

## 2022-08-08 PROCEDURE — 83880 ASSAY OF NATRIURETIC PEPTIDE: CPT

## 2022-08-08 PROCEDURE — 83690 ASSAY OF LIPASE: CPT

## 2022-08-08 PROCEDURE — 71045 X-RAY EXAM CHEST 1 VIEW: CPT

## 2022-08-08 PROCEDURE — 36415 COLL VENOUS BLD VENIPUNCTURE: CPT

## 2022-08-08 PROCEDURE — 93005 ELECTROCARDIOGRAM TRACING: CPT

## 2022-08-08 PROCEDURE — 85025 COMPLETE CBC W/AUTO DIFF WBC: CPT

## 2022-08-08 PROCEDURE — 99285 EMERGENCY DEPT VISIT HI MDM: CPT

## 2022-08-08 PROCEDURE — 96374 THER/PROPH/DIAG INJ IV PUSH: CPT

## 2022-08-08 PROCEDURE — 84484 ASSAY OF TROPONIN QUANT: CPT

## 2022-08-09 ENCOUNTER — HOSPITAL ENCOUNTER (EMERGENCY)
Age: 32
Discharge: HOME | End: 2022-08-09
Attending: EMERGENCY MEDICINE

## 2022-08-09 VITALS
SYSTOLIC BLOOD PRESSURE: 125 MMHG | DIASTOLIC BLOOD PRESSURE: 74 MMHG | HEIGHT: 71 IN | OXYGEN SATURATION: 93 % | WEIGHT: 315 LBS | BODY MASS INDEX: 44.1 KG/M2 | TEMPERATURE: 97.4 F | HEART RATE: 67 BPM | RESPIRATION RATE: 14 BRPM

## 2022-08-09 DIAGNOSIS — R07.89 ATYPICAL CHEST PAIN: Primary | ICD-10-CM

## 2022-08-09 LAB
ATRIAL RATE: 85 BPM
CALCULATED P AXIS, ECG09: 34 DEGREES
CALCULATED R AXIS, ECG10: 32 DEGREES
CALCULATED T AXIS, ECG11: 28 DEGREES
DIAGNOSIS, 93000: NORMAL
LIPASE SERPL-CCNC: 201 U/L (ref 73–393)
P-R INTERVAL, ECG05: 180 MS
Q-T INTERVAL, ECG07: 368 MS
QRS DURATION, ECG06: 78 MS
QTC CALCULATION (BEZET), ECG08: 437 MS
VENTRICULAR RATE, ECG03: 85 BPM

## 2022-08-09 PROCEDURE — 74011250636 HC RX REV CODE- 250/636: Performed by: EMERGENCY MEDICINE

## 2022-08-09 PROCEDURE — 96374 THER/PROPH/DIAG INJ IV PUSH: CPT

## 2022-08-09 RX ORDER — NAPROXEN 500 MG/1
500 TABLET ORAL 2 TIMES DAILY WITH MEALS
Qty: 60 TABLET | Refills: 0 | Status: SHIPPED | OUTPATIENT
Start: 2022-08-09 | End: 2022-09-08

## 2022-08-09 RX ORDER — KETOROLAC TROMETHAMINE 30 MG/ML
15 INJECTION, SOLUTION INTRAMUSCULAR; INTRAVENOUS
Status: COMPLETED | OUTPATIENT
Start: 2022-08-09 | End: 2022-08-09

## 2022-08-09 RX ADMIN — KETOROLAC TROMETHAMINE 15 MG: 30 INJECTION, SOLUTION INTRAMUSCULAR at 03:03

## 2022-08-09 NOTE — ED PROVIDER NOTES
EMERGENCY DEPARTMENT HISTORY AND PHYSICAL EXAM     ------------------------------------------------------------------------------------------------------  Please note that this dictation was completed with [email or web address], the [employer] voice recognition software. Quite often unanticipated grammatical, syntax, homophones, and other interpretive errors are inadvertently transcribed by the computer software. Please disregard these errors. Please excuse any errors that have escaped final proofreading.  -----------------------------------------------------------------------------------------------------------------    Date: 8/9/2022  Patient Name: Olamide Mcarthur History of Presenting Illness     Chief Complaint   Patient presents with    Chest Pain     Pt presents w/ c/o chest tightness for a few days. Pt denies any cardiac hx        History Provided By: Patient    HPI: Olamide Mcarthur is a 34 y.o. male, without significant pmhx, who presents via private vehicle to the ED with c/o several days of intermittent left lower chest sharp pain that happens intermittently and spontaneously resolves after a few seconds. Notes that it happens both at rest and with movement. No shortness of breath, nausea, vomiting or other abdominal pain reported. Similar symptoms in the past for which she received an ER work-up and did not find anything specific. Has an appointment with his primary care doctor tomorrow but has not ever followed up with cardiology. Pt also specifically denies any recent fevers, chills, changes in BM, urinary sxs, or headache. PCP: Areli Platt MD    Social Hx: denies tobacco, denies EtOH, denies recreational/ Illicit Drugs     There are no other complaints, changes, or physical findings at this time. No Known Allergies      Current Outpatient Medications   Medication Sig Dispense Refill    naproxen (NAPROSYN) 500 mg tablet Take 1 Tablet by mouth two (2) times daily (with meals) for 30 days.  61 Tablet 0    albuterol (PROVENTIL HFA, VENTOLIN HFA, PROAIR HFA) 90 mcg/actuation inhaler Take 2 Puffs by inhalation every four (4) hours as needed for Wheezing. (Patient not taking: Reported on 2/16/2022) 1 Each 0       Past History     Past Medical History:  No past medical history on file. Past Surgical History:  No past surgical history on file. Family History:  No family history on file. Social History:  Social History     Tobacco Use    Smoking status: Never    Smokeless tobacco: Never   Vaping Use    Vaping Use: Never used   Substance Use Topics    Alcohol use: Yes     Comment: weekends    Drug use: Yes     Types: Marijuana       Allergies:  No Known Allergies      Review of Systems   Review of Systems   Constitutional:  Negative for chills and fever. HENT: Negative. Eyes: Negative. Respiratory:  Negative for cough, chest tightness and shortness of breath. Cardiovascular:  Positive for chest pain. Negative for leg swelling. Gastrointestinal:  Negative for abdominal pain, diarrhea, nausea and vomiting. Endocrine: Negative. Genitourinary:  Negative for difficulty urinating and dysuria. Musculoskeletal:  Negative for myalgias. Skin: Negative. Neurological: Negative. Psychiatric/Behavioral: Negative. All other systems reviewed and are negative. Physical Exam   Physical Exam  Vitals and nursing note reviewed. Constitutional:       General: He is not in acute distress. Appearance: He is well-developed. He is morbidly obese. He is not diaphoretic. HENT:      Head: Normocephalic and atraumatic. Nose: Nose normal.      Mouth/Throat:      Pharynx: No oropharyngeal exudate. Eyes:      Conjunctiva/sclera: Conjunctivae normal.      Pupils: Pupils are equal, round, and reactive to light. Neck:      Vascular: No JVD. Cardiovascular:      Rate and Rhythm: Normal rate and regular rhythm. Heart sounds: Normal heart sounds. No murmur heard. No friction rub. Pulmonary:      Effort: Pulmonary effort is normal. No respiratory distress. Breath sounds: Normal breath sounds. No stridor. No wheezing or rales. Abdominal:      General: Bowel sounds are normal. There is no distension. Palpations: Abdomen is soft. Tenderness: There is no abdominal tenderness. There is no rebound. Musculoskeletal:         General: No tenderness. Normal range of motion. Cervical back: Normal range of motion and neck supple. Skin:     General: Skin is warm and dry. Findings: No rash. Neurological:      Mental Status: He is alert and oriented to person, place, and time. Cranial Nerves: No cranial nerve deficit. Psychiatric:         Speech: Speech normal.         Behavior: Behavior normal.         Thought Content:  Thought content normal.         Judgment: Judgment normal.         Diagnostic Study Results     Labs -     Recent Results (from the past 12 hour(s))   EKG, 12 LEAD, INITIAL    Collection Time: 08/08/22 10:48 PM   Result Value Ref Range    Ventricular Rate 85 BPM    Atrial Rate 85 BPM    P-R Interval 180 ms    QRS Duration 78 ms    Q-T Interval 368 ms    QTC Calculation (Bezet) 437 ms    Calculated P Axis 34 degrees    Calculated R Axis 32 degrees    Calculated T Axis 28 degrees    Diagnosis       Normal sinus rhythm  Normal ECG  When compared with ECG of 18-FEB-2022 03:38,  No significant change was found     CBC WITH AUTOMATED DIFF    Collection Time: 08/08/22 11:04 PM   Result Value Ref Range    WBC 5.4 4.1 - 11.1 K/uL    RBC 4.60 4.10 - 5.70 M/uL    HGB 12.2 12.1 - 17.0 g/dL    HCT 39.3 36.6 - 50.3 %    MCV 85.4 80.0 - 99.0 FL    MCH 26.5 26.0 - 34.0 PG    MCHC 31.0 30.0 - 36.5 g/dL    RDW 14.0 11.5 - 14.5 %    PLATELET 093 454 - 476 K/uL    MPV 9.1 8.9 - 12.9 FL    NRBC 0.0 0  WBC    ABSOLUTE NRBC 0.00 0.00 - 0.01 K/uL    NEUTROPHILS 58 32 - 75 %    LYMPHOCYTES 27 12 - 49 %    MONOCYTES 10 5 - 13 %    EOSINOPHILS 4 0 - 7 %    BASOPHILS 1 0 - 1 %    IMMATURE GRANULOCYTES 0 0.0 - 0.5 %    ABS. NEUTROPHILS 3.1 1.8 - 8.0 K/UL    ABS. LYMPHOCYTES 1.4 0.8 - 3.5 K/UL    ABS. MONOCYTES 0.5 0.0 - 1.0 K/UL    ABS. EOSINOPHILS 0.2 0.0 - 0.4 K/UL    ABS. BASOPHILS 0.1 0.0 - 0.1 K/UL    ABS. IMM. GRANS. 0.0 0.00 - 0.04 K/UL    DF AUTOMATED     METABOLIC PANEL, COMPREHENSIVE    Collection Time: 08/08/22 11:04 PM   Result Value Ref Range    Sodium 137 136 - 145 mmol/L    Potassium 4.2 3.5 - 5.1 mmol/L    Chloride 105 97 - 108 mmol/L    CO2 28 21 - 32 mmol/L    Anion gap 4 (L) 5 - 15 mmol/L    Glucose 116 (H) 65 - 100 mg/dL    BUN 17 6 - 20 MG/DL    Creatinine 1.00 0.70 - 1.30 MG/DL    BUN/Creatinine ratio 17 12 - 20      GFR est AA >60 >60 ml/min/1.73m2    GFR est non-AA >60 >60 ml/min/1.73m2    Calcium 9.1 8.5 - 10.1 MG/DL    Bilirubin, total 0.5 0.2 - 1.0 MG/DL    ALT (SGPT) 58 12 - 78 U/L    AST (SGOT) 38 (H) 15 - 37 U/L    Alk. phosphatase 92 45 - 117 U/L    Protein, total 7.6 6.4 - 8.2 g/dL    Albumin 3.3 (L) 3.5 - 5.0 g/dL    Globulin 4.3 (H) 2.0 - 4.0 g/dL    A-G Ratio 0.8 (L) 1.1 - 2.2     NT-PRO BNP    Collection Time: 08/08/22 11:04 PM   Result Value Ref Range    NT pro-BNP 45 <125 PG/ML   TROPONIN-HIGH SENSITIVITY    Collection Time: 08/08/22 11:04 PM   Result Value Ref Range    Troponin-High Sensitivity 8 0 - 76 ng/L   LIPASE    Collection Time: 08/08/22 11:04 PM   Result Value Ref Range    Lipase 201 73 - 393 U/L       Radiologic Studies -   XR CHEST PORT   Final Result   No acute cardiopulmonary disease. CT Results  (Last 48 hours)      None          CXR Results  (Last 48 hours)                 08/08/22 0509  XR CHEST PORT Final result    Impression:  No acute cardiopulmonary disease. Narrative:  INDICATION: Chest pain. Portable AP view of the chest.       Direct comparison made to prior chest x-ray dated February 2022. Cardiomediastinal silhouette is stable. Lungs are clear bilaterally.  Pleural   spaces are normal and there is no pneumothorax. Osseous structures are intact. Medical Decision Making   I am the first provider for this patient. I reviewed the vital signs, available nursing notes, past medical history, past surgical history, family history and social history. Vital Signs-Reviewed the patient's vital signs. Patient Vitals for the past 12 hrs:   Temp Pulse Resp BP SpO2   08/09/22 0300 -- 67 14 125/74 93 %   08/09/22 0230 -- 76 24 125/68 94 %   08/09/22 0149 -- 73 -- 127/69 95 %   08/08/22 2241 97.4 °F (36.3 °C) 74 18 (!) 170/70 100 %       Pulse Oximetry Analysis - 93% on RA Normal    Records Reviewed/Interpretted: Nursing Notes from triage and Old Medical Records    Provider Notes (Medical Decision Making):     DDX:  ACS, arrhythmia, pleural effusion, pneumonia, muscle spasm, pancreatitis    Plan:  EKG, labs, chest x-ray, lipase, analgesic    Impression:  Atypical chest pain    ED Course:   Initial assessment performed. The patients presenting problems have been discussed, and they are in agreement with the care plan formulated and outlined with them. I have encouraged them to ask questions as they arise throughout their visit. I reviewed our electronic medical record system for any past medical records that were available that may contribute to the patients current condition, the nursing notes and and vital signs from today's visit  Nursing notes will be reviewed as they become available in realtime while the pt has been in the ED. Jessy Leung MD    EKG interpretation 5461: NSR, nl Axis, rate 85; , QRS 78, QTc 437; NO STEMI interpreted by Jessy Leung MD    I personally reviewed/interpreted pt's imaging. Agree with official read by radiology as noted above. Jessy Leung MD      3:26 AM  Progress note:  Pt noted to be feeling better, ready for discharge. Discussed lab and imaging findings with pt, specifically noting negative chest x-ray.  Pt will follow up with primary care as instructed. All questions have been answered, pt voiced understanding and agreement with plan. Specific return precautions provided in addition to instructions for pt to return to the ED immediately should sx worsen at any time. Sheryle Keepers, MD             Critical Care Time:     none      Diagnosis     Clinical Impression:   1. Atypical chest pain        PLAN:  1. Current Discharge Medication List        START taking these medications    Details   naproxen (NAPROSYN) 500 mg tablet Take 1 Tablet by mouth two (2) times daily (with meals) for 30 days. Qty: 60 Tablet, Refills: 0  Start date: 8/9/2022, End date: 9/8/2022           2. Follow-up Information       Follow up With Specialties Details Why Cynthia Lucas MD Internal Medicine Physician Schedule an appointment as soon as possible for a visit today  [address]  [address] [place] [address]  [phone]      [facility] EMERGENCY DEPT Emergency Medicine  As needed, If symptoms worsen [address] [facility]    Pam Bartholomew MD Cardio Vascular Surgery, Clinical Cardiac Electrophysiology Physician, Cardiovascular Disease Physician Schedule an appointment as soon as possible for a visit in 2 days  [address]  [address]  [address] [phone]            Return to ED if worse     Disposition:    3:26 AM     The patient's results have been reviewed with family and/or caregiver. They verbally convey their understanding and agreement of the patient's signs, symptoms, diagnosis, treatment and prognosis and additionally agree to follow up as recommended in the discharge instructions or to return to the Emergency Room should the patient's condition change prior to their follow-up appointment. The family and/or caregiver verbally agrees with the care-plan and all of their questions have been answered.  The discharge instructions have also been provided to the them with educational information regarding the patient's diagnosis as well a list of reasons why the patient would want to return to the ER prior to their follow-up appointment should their condition change.   Sherry Shaikh MD

## 2022-08-09 NOTE — DISCHARGE INSTRUCTIONS
It was a pleasure taking care of you in our Emergency Department today. We know that when you come to [facility] you are entrusting us with your health, comfort, and safety. Our physicians and nurses honor that trust, and truly appreciate the opportunity to care for you and your loved ones. We also value your feedback. If you receive a survey about your Emergency Department experience today, please fill it out. We care about our patients' feedback, and we listen to what you have to say. Thank you!       Dr. Gail Franco MD.

## 2022-08-09 NOTE — Clinical Note
[address]  [facility] EMERGENCY DEPT  [address] [place] [address] [facility] [address] [phone]    Work/School Note    Date: 8/8/2022    To Whom It May concern:      Keon FlemingWilliam was seen and treated today in the emergency room by the following provider(s):  Attending Provider: Carlos Zepeda. is excused from work/school on 08/09/22. He is clear to return to work/school on 08/10/22.         Sincerely,          Lisa Lacey MD

## 2023-04-07 ENCOUNTER — HOSPITAL ENCOUNTER (OUTPATIENT)
Age: 33
End: 2023-04-07
Attending: EMERGENCY MEDICINE
Payer: COMMERCIAL

## 2023-09-04 ENCOUNTER — APPOINTMENT (OUTPATIENT)
Facility: HOSPITAL | Age: 33
End: 2023-09-04

## 2023-09-04 ENCOUNTER — HOSPITAL ENCOUNTER (EMERGENCY)
Facility: HOSPITAL | Age: 33
Discharge: HOME | End: 2023-09-04
Attending: EMERGENCY MEDICINE

## 2023-09-04 VITALS
TEMPERATURE: 98.4 F | WEIGHT: 315 LBS | RESPIRATION RATE: 15 BRPM | SYSTOLIC BLOOD PRESSURE: 137 MMHG | HEART RATE: 87 BPM | BODY MASS INDEX: 44.1 KG/M2 | DIASTOLIC BLOOD PRESSURE: 76 MMHG | OXYGEN SATURATION: 100 % | HEIGHT: 71 IN

## 2023-09-04 DIAGNOSIS — D86.9 SARCOIDOSIS: ICD-10-CM

## 2023-09-04 DIAGNOSIS — I10 ESSENTIAL HYPERTENSION: ICD-10-CM

## 2023-09-04 DIAGNOSIS — R09.89 PULMONARY VASCULAR CONGESTION: Primary | ICD-10-CM

## 2023-09-04 LAB
ALBUMIN SERPL-MCNC: 2.9 G/DL (ref 3.5–5)
ALBUMIN/GLOB SERPL: 0.6 (ref 1.1–2.2)
ALP SERPL-CCNC: 100 U/L (ref 45–117)
ALT SERPL-CCNC: 56 U/L (ref 12–78)
ANION GAP SERPL CALC-SCNC: 4 MMOL/L (ref 5–15)
AST SERPL-CCNC: 44 U/L (ref 15–37)
BASOPHILS # BLD: 0 K/UL (ref 0–0.1)
BASOPHILS NFR BLD: 0 % (ref 0–1)
BILIRUB SERPL-MCNC: 0.2 MG/DL (ref 0.2–1)
BUN SERPL-MCNC: 13 MG/DL (ref 6–20)
BUN/CREAT SERPL: 15 (ref 12–20)
CALCIUM SERPL-MCNC: 8.7 MG/DL (ref 8.5–10.1)
CHLORIDE SERPL-SCNC: 103 MMOL/L (ref 97–108)
CO2 SERPL-SCNC: 29 MMOL/L (ref 21–32)
CREAT SERPL-MCNC: 0.86 MG/DL (ref 0.7–1.3)
D DIMER PPP FEU-MCNC: 2 MG/L FEU (ref 0–0.65)
DIFFERENTIAL METHOD BLD: ABNORMAL
EKG ATRIAL RATE: 83 BPM
EKG DIAGNOSIS: NORMAL
EKG P AXIS: 77 DEGREES
EKG P-R INTERVAL: 184 MS
EKG Q-T INTERVAL: 372 MS
EKG QRS DURATION: 76 MS
EKG QTC CALCULATION (BAZETT): 437 MS
EKG R AXIS: 36 DEGREES
EKG T AXIS: 30 DEGREES
EKG VENTRICULAR RATE: 83 BPM
EOSINOPHIL # BLD: 0.2 K/UL (ref 0–0.4)
EOSINOPHIL NFR BLD: 4 % (ref 0–7)
ERYTHROCYTE [DISTWIDTH] IN BLOOD BY AUTOMATED COUNT: 14.3 % (ref 11.5–14.5)
GLOBULIN SER CALC-MCNC: 5.2 G/DL (ref 2–4)
GLUCOSE SERPL-MCNC: 122 MG/DL (ref 65–100)
HCT VFR BLD AUTO: 35 % (ref 36.6–50.3)
HGB BLD-MCNC: 11.1 G/DL (ref 12.1–17)
IMM GRANULOCYTES # BLD AUTO: 0 K/UL (ref 0–0.04)
IMM GRANULOCYTES NFR BLD AUTO: 0 % (ref 0–0.5)
LYMPHOCYTES # BLD: 1 K/UL (ref 0.8–3.5)
LYMPHOCYTES NFR BLD: 20 % (ref 12–49)
MCH RBC QN AUTO: 27.3 PG (ref 26–34)
MCHC RBC AUTO-ENTMCNC: 31.7 G/DL (ref 30–36.5)
MCV RBC AUTO: 86.2 FL (ref 80–99)
MONOCYTES # BLD: 0.6 K/UL (ref 0–1)
MONOCYTES NFR BLD: 11 % (ref 5–13)
NEUTS SEG # BLD: 3.1 K/UL (ref 1.8–8)
NEUTS SEG NFR BLD: 65 % (ref 32–75)
NRBC # BLD: 0 K/UL (ref 0–0.01)
NRBC BLD-RTO: 0 PER 100 WBC
NT PRO BNP: 44 PG/ML
PLATELET # BLD AUTO: 282 K/UL (ref 150–400)
PMV BLD AUTO: 9.3 FL (ref 8.9–12.9)
POTASSIUM SERPL-SCNC: 3.9 MMOL/L (ref 3.5–5.1)
PROT SERPL-MCNC: 8.1 G/DL (ref 6.4–8.2)
RBC # BLD AUTO: 4.06 M/UL (ref 4.1–5.7)
SODIUM SERPL-SCNC: 136 MMOL/L (ref 136–145)
TROPONIN I SERPL HS-MCNC: 14 NG/L (ref 0–76)
WBC # BLD AUTO: 4.9 K/UL (ref 4.1–11.1)

## 2023-09-04 PROCEDURE — 85025 COMPLETE CBC W/AUTO DIFF WBC: CPT

## 2023-09-04 PROCEDURE — 71045 X-RAY EXAM CHEST 1 VIEW: CPT

## 2023-09-04 PROCEDURE — 71275 CT ANGIOGRAPHY CHEST: CPT

## 2023-09-04 PROCEDURE — 85379 FIBRIN DEGRADATION QUANT: CPT

## 2023-09-04 PROCEDURE — 84484 ASSAY OF TROPONIN QUANT: CPT

## 2023-09-04 PROCEDURE — 96374 THER/PROPH/DIAG INJ IV PUSH: CPT

## 2023-09-04 PROCEDURE — 36415 COLL VENOUS BLD VENIPUNCTURE: CPT

## 2023-09-04 PROCEDURE — 83880 ASSAY OF NATRIURETIC PEPTIDE: CPT

## 2023-09-04 PROCEDURE — 6370000000 HC RX 637 (ALT 250 FOR IP): Performed by: EMERGENCY MEDICINE

## 2023-09-04 PROCEDURE — 6360000002 HC RX W HCPCS: Performed by: EMERGENCY MEDICINE

## 2023-09-04 PROCEDURE — 6360000004 HC RX CONTRAST MEDICATION: Performed by: EMERGENCY MEDICINE

## 2023-09-04 PROCEDURE — 93005 ELECTROCARDIOGRAM TRACING: CPT | Performed by: EMERGENCY MEDICINE

## 2023-09-04 PROCEDURE — 80053 COMPREHEN METABOLIC PANEL: CPT

## 2023-09-04 PROCEDURE — 99285 EMERGENCY DEPT VISIT HI MDM: CPT

## 2023-09-04 RX ORDER — FUROSEMIDE 10 MG/ML
40 INJECTION INTRAMUSCULAR; INTRAVENOUS ONCE
Status: COMPLETED | OUTPATIENT
Start: 2023-09-04 | End: 2023-09-04

## 2023-09-04 RX ORDER — NAPROXEN 500 MG/1
500 TABLET ORAL 2 TIMES DAILY WITH MEALS
COMMUNITY

## 2023-09-04 RX ORDER — IPRATROPIUM BROMIDE AND ALBUTEROL SULFATE 2.5; .5 MG/3ML; MG/3ML
1 SOLUTION RESPIRATORY (INHALATION)
Status: COMPLETED | OUTPATIENT
Start: 2023-09-04 | End: 2023-09-04

## 2023-09-04 RX ORDER — PREDNISONE 10 MG/1
TABLET ORAL
Qty: 42 TABLET | Refills: 0 | Status: SHIPPED | OUTPATIENT
Start: 2023-09-04 | End: 2023-09-04 | Stop reason: SDUPTHER

## 2023-09-04 RX ORDER — FUROSEMIDE 40 MG/1
40 TABLET ORAL DAILY
Qty: 30 TABLET | Refills: 0 | Status: SHIPPED | OUTPATIENT
Start: 2023-09-04

## 2023-09-04 RX ORDER — FUROSEMIDE 40 MG/1
40 TABLET ORAL DAILY
Qty: 30 TABLET | Refills: 0 | Status: SHIPPED | OUTPATIENT
Start: 2023-09-04 | End: 2023-09-04 | Stop reason: SDUPTHER

## 2023-09-04 RX ORDER — PREDNISONE 10 MG/1
TABLET ORAL
Qty: 42 TABLET | Refills: 0 | Status: SHIPPED | OUTPATIENT
Start: 2023-09-04 | End: 2023-09-16

## 2023-09-04 RX ORDER — DOXYCYCLINE HYCLATE 100 MG
100 TABLET ORAL 2 TIMES DAILY
Qty: 14 TABLET | Refills: 0 | Status: SHIPPED | OUTPATIENT
Start: 2023-09-04 | End: 2023-09-11

## 2023-09-04 RX ORDER — PREDNISONE 50 MG/1
50 TABLET ORAL DAILY
Qty: 5 TABLET | Refills: 0 | Status: SHIPPED | OUTPATIENT
Start: 2023-09-04 | End: 2023-09-04

## 2023-09-04 RX ORDER — DOXYCYCLINE HYCLATE 100 MG
100 TABLET ORAL 2 TIMES DAILY
Qty: 14 TABLET | Refills: 0 | Status: SHIPPED | OUTPATIENT
Start: 2023-09-04 | End: 2023-09-04 | Stop reason: SDUPTHER

## 2023-09-04 RX ADMIN — IPRATROPIUM BROMIDE AND ALBUTEROL SULFATE 1 DOSE: .5; 3 SOLUTION RESPIRATORY (INHALATION) at 01:48

## 2023-09-04 RX ADMIN — FUROSEMIDE 40 MG: 10 INJECTION, SOLUTION INTRAMUSCULAR; INTRAVENOUS at 03:16

## 2023-09-04 RX ADMIN — IOPAMIDOL 180 ML: 755 INJECTION, SOLUTION INTRAVENOUS at 03:16

## 2023-09-04 ASSESSMENT — PAIN DESCRIPTION - LOCATION
LOCATION: CHEST
LOCATION: CHEST

## 2023-09-04 ASSESSMENT — PAIN DESCRIPTION - DESCRIPTORS: DESCRIPTORS: TIGHTNESS;PRESSURE

## 2023-09-04 ASSESSMENT — LIFESTYLE VARIABLES
HOW MANY STANDARD DRINKS CONTAINING ALCOHOL DO YOU HAVE ON A TYPICAL DAY: PATIENT DOES NOT DRINK
HOW OFTEN DO YOU HAVE A DRINK CONTAINING ALCOHOL: NEVER

## 2023-09-04 ASSESSMENT — PAIN SCALES - GENERAL
PAINLEVEL_OUTOF10: 5
PAINLEVEL_OUTOF10: 5

## 2023-09-04 ASSESSMENT — PAIN DESCRIPTION - ORIENTATION: ORIENTATION: LEFT

## 2023-09-04 NOTE — ED PROVIDER NOTES
[facility] EMERGENCY DEPT  EMERGENCY DEPARTMENT ENCOUNTER       Pt Name: Blaire Forde MRN: [id number]  Birthdate 1990  Date of evaluation: 9/4/2023  Provider: Curtis Casas DO   PCP: Heather Moreira MD  Note Started: 1:35 AM EDT 9/4/23     CHIEF COMPLAINT       Chief Complaint   Patient presents with    Chest Pain     Patient ambulatory to triage complaining of chest pain. Patient reports watching television when he began to feel a pain in the center to left side of his chest. Patient denies any other symptoms at this time. Patient does acknowledge exposure to COVID however he reports he tested negative. HISTORY OF PRESENT ILLNESS: 1 or more elements      History From: Patient, History limited by: none     Blaire Hayes. is a 28 y.o. male presenting the emergency department complaining of chest pain, he reports a cough that is been going on for a few weeks, today he had some retrosternal chest discomfort that was more to the left side. It lasted about 30 minutes, described as sharp, does report some lower extremity edema, worse on the right which she states is chronic. Denies any fever, no prior history of DVT or PE. Please See MDM for Additional Details of the HPI/PMH  Nursing Notes were all reviewed and agreed with or any disagreements were addressed in the HPI. REVIEW OF SYSTEMS        Positives and Pertinent negatives as per HPI. PAST HISTORY     Past Medical History:  No past medical history on file. Past Surgical History:  No past surgical history on file. Family History:  No family history on file.     Social History:  Social History     Tobacco Use    Smoking status: Never    Smokeless tobacco: Never   Substance Use Topics    Alcohol use: Yes    Drug use: Yes     Types: Marijuana (Weed)       Allergies:  No Known Allergies    CURRENT MEDICATIONS      Discharge Medication List as of 9/4/2023  5:10 AM        CONTINUE these medications which have NOT CHANGED    Details

## 2023-09-18 ENCOUNTER — HOSPITAL ENCOUNTER (OUTPATIENT)
Facility: HOSPITAL | Age: 33
Discharge: HOME | End: 2023-09-18
Attending: INTERNAL MEDICINE | Admitting: INTERNAL MEDICINE
Payer: MEDICAID

## 2023-09-18 VITALS
BODY MASS INDEX: 44.1 KG/M2 | OXYGEN SATURATION: 97 % | SYSTOLIC BLOOD PRESSURE: 147 MMHG | TEMPERATURE: 98.1 F | DIASTOLIC BLOOD PRESSURE: 83 MMHG | RESPIRATION RATE: 19 BRPM | HEIGHT: 71 IN | WEIGHT: 315 LBS | HEART RATE: 84 BPM

## 2023-09-18 DIAGNOSIS — R07.89 OTHER CHEST PAIN: ICD-10-CM

## 2023-09-18 LAB
ANION GAP SERPL CALC-SCNC: 4 MMOL/L (ref 5–15)
BUN SERPL-MCNC: 11 MG/DL (ref 6–20)
BUN/CREAT SERPL: 12 (ref 12–20)
CALCIUM SERPL-MCNC: 8.8 MG/DL (ref 8.5–10.1)
CHLORIDE SERPL-SCNC: 104 MMOL/L (ref 97–108)
CO2 SERPL-SCNC: 29 MMOL/L (ref 21–32)
CREAT SERPL-MCNC: 0.91 MG/DL (ref 0.7–1.3)
ERYTHROCYTE [DISTWIDTH] IN BLOOD BY AUTOMATED COUNT: 14.6 % (ref 11.5–14.5)
GLUCOSE SERPL-MCNC: 91 MG/DL (ref 65–100)
HCT VFR BLD AUTO: 38.1 % (ref 36.6–50.3)
HGB BLD-MCNC: 11.4 G/DL (ref 12.1–17)
MCH RBC QN AUTO: 26.1 PG (ref 26–34)
MCHC RBC AUTO-ENTMCNC: 29.9 G/DL (ref 30–36.5)
MCV RBC AUTO: 87.2 FL (ref 80–99)
NRBC # BLD: 0 K/UL (ref 0–0.01)
NRBC BLD-RTO: 0 PER 100 WBC
PLATELET # BLD AUTO: 293 K/UL (ref 150–400)
PMV BLD AUTO: 9.7 FL (ref 8.9–12.9)
POTASSIUM SERPL-SCNC: 4.1 MMOL/L (ref 3.5–5.1)
RBC # BLD AUTO: 4.37 M/UL (ref 4.1–5.7)
SODIUM SERPL-SCNC: 137 MMOL/L (ref 136–145)
WBC # BLD AUTO: 4 K/UL (ref 4.1–11.1)

## 2023-09-18 PROCEDURE — C1769 GUIDE WIRE: HCPCS | Performed by: INTERNAL MEDICINE

## 2023-09-18 PROCEDURE — 93453 R&L HRT CATH W/VENTRICLGRPHY: CPT | Performed by: INTERNAL MEDICINE

## 2023-09-18 PROCEDURE — 2500000003 HC RX 250 WO HCPCS: Performed by: INTERNAL MEDICINE

## 2023-09-18 PROCEDURE — C1894 INTRO/SHEATH, NON-LASER: HCPCS | Performed by: INTERNAL MEDICINE

## 2023-09-18 PROCEDURE — 99152 MOD SED SAME PHYS/QHP 5/>YRS: CPT | Performed by: INTERNAL MEDICINE

## 2023-09-18 PROCEDURE — 99153 MOD SED SAME PHYS/QHP EA: CPT | Performed by: INTERNAL MEDICINE

## 2023-09-18 PROCEDURE — 2709999900 HC NON-CHARGEABLE SUPPLY: Performed by: INTERNAL MEDICINE

## 2023-09-18 PROCEDURE — 6360000004 HC RX CONTRAST MEDICATION: Performed by: INTERNAL MEDICINE

## 2023-09-18 PROCEDURE — 6360000002 HC RX W HCPCS: Performed by: INTERNAL MEDICINE

## 2023-09-18 PROCEDURE — 36415 COLL VENOUS BLD VENIPUNCTURE: CPT

## 2023-09-18 PROCEDURE — 80048 BASIC METABOLIC PNL TOTAL CA: CPT

## 2023-09-18 PROCEDURE — 76937 US GUIDE VASCULAR ACCESS: CPT | Performed by: INTERNAL MEDICINE

## 2023-09-18 PROCEDURE — 85027 COMPLETE CBC AUTOMATED: CPT

## 2023-09-18 PROCEDURE — C1725 CATH, TRANSLUMIN NON-LASER: HCPCS | Performed by: INTERNAL MEDICINE

## 2023-09-18 RX ORDER — VERAPAMIL HYDROCHLORIDE 2.5 MG/ML
INJECTION, SOLUTION INTRAVENOUS PRN
Status: DISCONTINUED | OUTPATIENT
Start: 2023-09-18 | End: 2023-09-18 | Stop reason: HOSPADM

## 2023-09-18 RX ORDER — SODIUM CHLORIDE 0.9 % (FLUSH) 0.9 %
5-40 SYRINGE (ML) INJECTION PRN
Status: CANCELLED | OUTPATIENT
Start: 2023-09-18

## 2023-09-18 RX ORDER — SODIUM CHLORIDE 0.9 % (FLUSH) 0.9 %
5-40 SYRINGE (ML) INJECTION EVERY 12 HOURS SCHEDULED
Status: CANCELLED | OUTPATIENT
Start: 2023-09-18

## 2023-09-18 RX ORDER — SODIUM CHLORIDE 9 MG/ML
INJECTION, SOLUTION INTRAVENOUS CONTINUOUS
Status: CANCELLED | OUTPATIENT
Start: 2023-09-18

## 2023-09-18 RX ORDER — SODIUM CHLORIDE 9 MG/ML
INJECTION, SOLUTION INTRAVENOUS PRN
Status: CANCELLED | OUTPATIENT
Start: 2023-09-18

## 2023-09-18 RX ORDER — HEPARIN SODIUM 10000 [USP'U]/ML
INJECTION, SOLUTION INTRAVENOUS; SUBCUTANEOUS PRN
Status: DISCONTINUED | OUTPATIENT
Start: 2023-09-18 | End: 2023-09-18 | Stop reason: HOSPADM

## 2023-09-18 RX ORDER — HEPARIN SODIUM 1000 [USP'U]/ML
INJECTION, SOLUTION INTRAVENOUS; SUBCUTANEOUS PRN
Status: DISCONTINUED | OUTPATIENT
Start: 2023-09-18 | End: 2023-09-18 | Stop reason: HOSPADM

## 2023-09-18 RX ORDER — LIDOCAINE HYDROCHLORIDE 10 MG/ML
INJECTION, SOLUTION INFILTRATION; PERINEURAL PRN
Status: DISCONTINUED | OUTPATIENT
Start: 2023-09-18 | End: 2023-09-18 | Stop reason: HOSPADM

## 2023-09-18 RX ORDER — FENTANYL CITRATE 50 UG/ML
INJECTION, SOLUTION INTRAMUSCULAR; INTRAVENOUS PRN
Status: DISCONTINUED | OUTPATIENT
Start: 2023-09-18 | End: 2023-09-18 | Stop reason: HOSPADM

## 2023-09-18 NOTE — PROGRESS NOTES
LHC, RHC, cor angio, LV gram completed s complications   [place]    Findings    1- no angiographically signif CAD  2- elevated rt and left sided filling pressures  3- Nl LV systolic fxn    Recc    Change furosemide to torsemide 40 mg qd    F/U 3-4 weeks     D/c home today

## 2023-09-18 NOTE — PROGRESS NOTES
Post right and left heart catheterization via right radial artery and right brachial vein. Pt resting well, awake and alert, VSS, tolerated very well. Right radial band weaned off without difficulty, no ooze, no hematoma, applied quick clot and tegaderm. Monitored closely. Right brachial dressing dry and intact, no ooze, no hematoma. Discussed discharge instructions with pt and wife, pt and wife verbalized understanding. Assisted pt with dressing, ambulating with no difficulties. Escorted pt to exit via wheelchair with belongings and discharge instructions. Wife driving pt home.

## 2023-09-19 NOTE — CARDIO/PULMONARY
Chart reviewed: Patient is 28 y.o. male admitted with Other chest pain [R07.89]    Cardiac cath 9/18/23 without intervention:   \"Findings  1- no angiographically signif CAD  2- elevated rt and left sided filling pressures  3- Nl LV systolic fxn\"    Charisse Kiran RN

## 2023-09-26 LAB — ECHO BSA: 3.14 M2

## 2023-12-14 ENCOUNTER — APPOINTMENT (OUTPATIENT)
Facility: HOSPITAL | Age: 33
End: 2023-12-14
Payer: MEDICAID

## 2023-12-14 ENCOUNTER — HOSPITAL ENCOUNTER (EMERGENCY)
Facility: HOSPITAL | Age: 33
Discharge: HOME | End: 2023-12-14
Payer: MEDICAID

## 2023-12-14 VITALS
TEMPERATURE: 97.5 F | DIASTOLIC BLOOD PRESSURE: 71 MMHG | OXYGEN SATURATION: 100 % | HEART RATE: 92 BPM | HEIGHT: 71 IN | WEIGHT: 315 LBS | SYSTOLIC BLOOD PRESSURE: 148 MMHG | RESPIRATION RATE: 20 BRPM | BODY MASS INDEX: 44.1 KG/M2

## 2023-12-14 DIAGNOSIS — J98.11 ATELECTASIS, RIGHT: ICD-10-CM

## 2023-12-14 DIAGNOSIS — R59.0 HILAR ADENOPATHY: ICD-10-CM

## 2023-12-14 DIAGNOSIS — R09.81 NASAL SINUS CONGESTION: Primary | ICD-10-CM

## 2023-12-14 DIAGNOSIS — Z86.2 HISTORY OF SARCOIDOSIS: ICD-10-CM

## 2023-12-14 LAB
FLUAV AG NPH QL IA: NEGATIVE
FLUBV AG NOSE QL IA: NEGATIVE
SARS-COV-2 RDRP RESP QL NAA+PROBE: NOT DETECTED
SOURCE: NORMAL

## 2023-12-14 PROCEDURE — 99284 EMERGENCY DEPT VISIT MOD MDM: CPT

## 2023-12-14 PROCEDURE — 87804 INFLUENZA ASSAY W/OPTIC: CPT

## 2023-12-14 PROCEDURE — 6360000002 HC RX W HCPCS: Performed by: PHYSICIAN ASSISTANT

## 2023-12-14 PROCEDURE — 87635 SARS-COV-2 COVID-19 AMP PRB: CPT

## 2023-12-14 PROCEDURE — 71046 X-RAY EXAM CHEST 2 VIEWS: CPT

## 2023-12-14 RX ORDER — CETIRIZINE HYDROCHLORIDE 10 MG/1
10 TABLET ORAL DAILY
Qty: 30 TABLET | Refills: 0 | Status: SHIPPED | OUTPATIENT
Start: 2023-12-14 | End: 2024-01-13

## 2023-12-14 RX ORDER — METHYLPREDNISOLONE 4 MG/1
TABLET ORAL
Qty: 1 KIT | Refills: 0 | Status: SHIPPED | OUTPATIENT
Start: 2023-12-14 | End: 2023-12-20

## 2023-12-14 RX ORDER — AZITHROMYCIN 250 MG/1
250 TABLET, FILM COATED ORAL SEE ADMIN INSTRUCTIONS
Qty: 6 TABLET | Refills: 0 | Status: SHIPPED | OUTPATIENT
Start: 2023-12-14 | End: 2023-12-19

## 2023-12-14 RX ORDER — FLUTICASONE PROPIONATE 50 MCG
1 SPRAY, SUSPENSION (ML) NASAL DAILY
Qty: 16 G | Refills: 0 | Status: SHIPPED | OUTPATIENT
Start: 2023-12-14

## 2023-12-14 RX ORDER — DEXAMETHASONE SODIUM PHOSPHATE 10 MG/ML
6 INJECTION, SOLUTION INTRAMUSCULAR; INTRAVENOUS ONCE
Status: COMPLETED | OUTPATIENT
Start: 2023-12-14 | End: 2023-12-14

## 2023-12-14 RX ADMIN — DEXAMETHASONE SODIUM PHOSPHATE 6 MG: 10 INJECTION, SOLUTION INTRAMUSCULAR; INTRAVENOUS at 14:36

## 2023-12-14 ASSESSMENT — PAIN SCALES - GENERAL: PAINLEVEL_OUTOF10: 2

## 2023-12-14 ASSESSMENT — PAIN - FUNCTIONAL ASSESSMENT: PAIN_FUNCTIONAL_ASSESSMENT: 0-10

## 2023-12-14 NOTE — ED PROVIDER NOTES
[place] [address]      Phone: [phone]   azithromycin 250 MG tablet  cetirizine 10 MG tablet  fluticasone 50 MCG/ACT nasal spray  methylPREDNISolone 4 MG tablet           DISCONTINUED MEDICATIONS:  Discharge Medication List as of 12/14/2023  3:20 PM              I am the Primary Clinician of Record. GIORGIO Arroyo (electronically signed)    (Please note that parts of this dictation were completed with voice recognition software. Quite often unanticipated grammatical, syntax, homophones, and other interpretive errors are inadvertently transcribed by the computer software. Please disregards these errors.  Please excuse any errors that have escaped final proofreading.)         Candy Arroyo  12/14/23 3196

## 2023-12-14 NOTE — DISCHARGE INSTRUCTIONS
Thank You! It was a pleasure taking care of you in our Emergency Department today. We know that when you come to [address] [place] [address], you are entrusting us with your health, comfort, and safety. Our clinicians honor that trust, and truly appreciate the opportunity to care for you and your loved ones. We also value your feedback. If you receive a survey about your Emergency Department experience today, please fill it out. We care about our patients' feedback, and we listen to what you have to say. Thank you. Armando Recio PA-C    _________________________________________________  I have included a copy of your lab results and/or radiologic studies from today's visit so you can have them easily available at your follow-up visit. Recent Results (from the past 12 hour(s))   Rapid influenza A/B antigens    Collection Time: 12/14/23  2:39 PM    Specimen: Nasopharyngeal   Result Value Ref Range    Influenza A Ag Negative NEG      Influenza B Ag Negative NEG     COVID-19, Rapid    Collection Time: 12/14/23  2:39 PM    Specimen: Nasopharyngeal   Result Value Ref Range    Source Nasopharyngeal      SARS-CoV-2, Rapid Not detected NOTD         XR CHEST (2 VW)   Final Result      Right perihilar atelectasis/consolidation. Hilar adenopathy is demonstrated.         [unfilled]

## 2024-03-27 ENCOUNTER — APPOINTMENT (OUTPATIENT)
Facility: HOSPITAL | Age: 34
End: 2024-03-27
Payer: MEDICAID

## 2024-03-27 ENCOUNTER — HOSPITAL ENCOUNTER (EMERGENCY)
Facility: HOSPITAL | Age: 34
Discharge: HOME | End: 2024-03-27
Attending: EMERGENCY MEDICINE
Payer: MEDICAID

## 2024-03-27 VITALS
RESPIRATION RATE: 18 BRPM | TEMPERATURE: 98.3 F | DIASTOLIC BLOOD PRESSURE: 75 MMHG | BODY MASS INDEX: 44.1 KG/M2 | SYSTOLIC BLOOD PRESSURE: 145 MMHG | HEART RATE: 88 BPM | WEIGHT: 315 LBS | HEIGHT: 71 IN | OXYGEN SATURATION: 99 %

## 2024-03-27 DIAGNOSIS — R07.9 CHEST PAIN, UNSPECIFIED TYPE: Primary | ICD-10-CM

## 2024-03-27 LAB
ALBUMIN SERPL-MCNC: 3 G/DL (ref 3.5–5)
ALBUMIN/GLOB SERPL: 0.6 (ref 1.1–2.2)
ALP SERPL-CCNC: 94 U/L (ref 45–117)
ALT SERPL-CCNC: 30 U/L (ref 12–78)
ANION GAP SERPL CALC-SCNC: 5 MMOL/L (ref 5–15)
AST SERPL-CCNC: 29 U/L (ref 15–37)
BASOPHILS # BLD: 0 K/UL (ref 0–0.1)
BASOPHILS NFR BLD: 1 % (ref 0–1)
BILIRUB SERPL-MCNC: 0.3 MG/DL (ref 0.2–1)
BUN SERPL-MCNC: 14 MG/DL (ref 6–20)
BUN/CREAT SERPL: 16 (ref 12–20)
CALCIUM SERPL-MCNC: 9.2 MG/DL (ref 8.5–10.1)
CHLORIDE SERPL-SCNC: 99 MMOL/L (ref 97–108)
CO2 SERPL-SCNC: 31 MMOL/L (ref 21–32)
CREAT SERPL-MCNC: 0.89 MG/DL (ref 0.7–1.3)
DIFFERENTIAL METHOD BLD: ABNORMAL
EOSINOPHIL # BLD: 0.1 K/UL (ref 0–0.4)
EOSINOPHIL NFR BLD: 3 % (ref 0–7)
ERYTHROCYTE [DISTWIDTH] IN BLOOD BY AUTOMATED COUNT: 15.6 % (ref 11.5–14.5)
GLOBULIN SER CALC-MCNC: 4.9 G/DL (ref 2–4)
GLUCOSE SERPL-MCNC: 110 MG/DL (ref 65–100)
HCT VFR BLD AUTO: 33.4 % (ref 36.6–50.3)
HGB BLD-MCNC: 9.9 G/DL (ref 12.1–17)
IMM GRANULOCYTES # BLD AUTO: 0 K/UL (ref 0–0.04)
IMM GRANULOCYTES NFR BLD AUTO: 0 % (ref 0–0.5)
LYMPHOCYTES # BLD: 0.9 K/UL (ref 0.8–3.5)
LYMPHOCYTES NFR BLD: 18 % (ref 12–49)
MCH RBC QN AUTO: 25.2 PG (ref 26–34)
MCHC RBC AUTO-ENTMCNC: 29.6 G/DL (ref 30–36.5)
MCV RBC AUTO: 85 FL (ref 80–99)
MONOCYTES # BLD: 0.6 K/UL (ref 0–1)
MONOCYTES NFR BLD: 13 % (ref 5–13)
NEUTS SEG # BLD: 3.2 K/UL (ref 1.8–8)
NEUTS SEG NFR BLD: 65 % (ref 32–75)
NRBC # BLD: 0 K/UL (ref 0–0.01)
NRBC BLD-RTO: 0 PER 100 WBC
NT PRO BNP: 158 PG/ML
PLATELET # BLD AUTO: 281 K/UL (ref 150–400)
PMV BLD AUTO: 9 FL (ref 8.9–12.9)
POTASSIUM SERPL-SCNC: 4 MMOL/L (ref 3.5–5.1)
PROT SERPL-MCNC: 7.9 G/DL (ref 6.4–8.2)
RBC # BLD AUTO: 3.93 M/UL (ref 4.1–5.7)
SODIUM SERPL-SCNC: 135 MMOL/L (ref 136–145)
TROPONIN I SERPL HS-MCNC: 11 NG/L (ref 0–76)
TROPONIN I SERPL HS-MCNC: 11 NG/L (ref 0–76)
WBC # BLD AUTO: 4.8 K/UL (ref 4.1–11.1)

## 2024-03-27 PROCEDURE — 85025 COMPLETE CBC W/AUTO DIFF WBC: CPT

## 2024-03-27 PROCEDURE — 99285 EMERGENCY DEPT VISIT HI MDM: CPT

## 2024-03-27 PROCEDURE — 36415 COLL VENOUS BLD VENIPUNCTURE: CPT

## 2024-03-27 PROCEDURE — 84484 ASSAY OF TROPONIN QUANT: CPT

## 2024-03-27 PROCEDURE — 93005 ELECTROCARDIOGRAM TRACING: CPT | Performed by: EMERGENCY MEDICINE

## 2024-03-27 PROCEDURE — 71045 X-RAY EXAM CHEST 1 VIEW: CPT

## 2024-03-27 PROCEDURE — 80053 COMPREHEN METABOLIC PANEL: CPT

## 2024-03-27 PROCEDURE — 83880 ASSAY OF NATRIURETIC PEPTIDE: CPT

## 2024-03-27 ASSESSMENT — ENCOUNTER SYMPTOMS
EYES NEGATIVE: 1
GASTROINTESTINAL NEGATIVE: 1
ALLERGIC/IMMUNOLOGIC NEGATIVE: 1
SHORTNESS OF BREATH: 1

## 2024-03-27 ASSESSMENT — PAIN SCALES - GENERAL: PAINLEVEL_OUTOF10: 6

## 2024-03-27 ASSESSMENT — PAIN - FUNCTIONAL ASSESSMENT
PAIN_FUNCTIONAL_ASSESSMENT: NONE - DENIES PAIN
PAIN_FUNCTIONAL_ASSESSMENT: 0-10

## 2024-03-27 ASSESSMENT — PAIN DESCRIPTION - LOCATION: LOCATION: CHEST

## 2024-03-27 ASSESSMENT — HEART SCORE
ECG: NORMAL
ECG: NORMAL

## 2024-03-27 NOTE — ED NOTES
shift change report given to Idalia RILEY (oncoming nurse) by Denia (offgoing nurse). Report included the following information Nurse Handoff Report, ED SBAR, MAR, and Cardiac Rhythm

## 2024-03-27 NOTE — ED PROVIDER NOTES
[facility] EMERGENCY DEPT  EMERGENCY DEPARTMENT ENCOUNTER       Pt Name: Quinn Bojorquez Jr.  MRN: [id number]  Birthdate 1990  Date of evaluation: 3/27/2024  Provider: Radha Carias PA-C   PCP: Idalia Cui MD  Note Started: 4:09 PM EDT 3/27/24     CHIEF COMPLAINT       Chief Complaint   Patient presents with    Chest Pain     Pt reports to ed complaining of chest pain for 2-3 days        HISTORY OF PRESENT ILLNESS: 1 or more elements      History From: Patient  HPI Limitations: None     Quinn Bojorquez Jr. is a 33 y.o. male who presents with chest pain over the last 3 days.     Nursing Notes were all reviewed and agreed with or any disagreements were addressed in the HPI.     REVIEW OF SYSTEMS      Review of Systems   Constitutional: Negative.    HENT: Negative.     Eyes: Negative.    Respiratory:  Positive for shortness of breath.    Cardiovascular:  Positive for chest pain and leg swelling.   Gastrointestinal: Negative.    Endocrine: Negative.    Genitourinary: Negative.    Musculoskeletal: Negative.    Skin: Negative.    Allergic/Immunologic: Negative.    Neurological: Negative.    Hematological: Negative.    Psychiatric/Behavioral: Negative.          Positives and Pertinent negatives as per HPI.    PAST HISTORY     Past Medical History:  History reviewed. No pertinent past medical history.    Past Surgical History:  Past Surgical History:   Procedure Laterality Date    CARDIAC PROCEDURE N/A 9/18/2023    Left and right heart cath / coronary angiography performed by Aquiles Verma MD at [facility] CARDIAC CATH LAB    CARDIAC PROCEDURE N/A 9/18/2023    Left ventriculography performed by Aquiles Verma MD at [facility] CARDIAC CATH LAB    INVASIVE VASCULAR N/A 9/18/2023    Ultrasound guided vascular access performed by Aquiles Verma MD at [facility] CARDIAC CATH LAB       Family History:  History reviewed. No pertinent family history.    Social History:  Social History     Tobacco Use    Smoking status: Never    Smokeless tobacco:

## 2024-03-28 LAB
EKG ATRIAL RATE: 87 BPM
EKG DIAGNOSIS: NORMAL
EKG P AXIS: 32 DEGREES
EKG P-R INTERVAL: 184 MS
EKG Q-T INTERVAL: 362 MS
EKG QRS DURATION: 80 MS
EKG QTC CALCULATION (BAZETT): 435 MS
EKG R AXIS: 46 DEGREES
EKG T AXIS: 42 DEGREES
EKG VENTRICULAR RATE: 87 BPM

## 2024-09-26 ENCOUNTER — APPOINTMENT (OUTPATIENT)
Facility: HOSPITAL | Age: 34
End: 2024-09-26
Payer: MEDICAID

## 2024-09-26 ENCOUNTER — HOSPITAL ENCOUNTER (EMERGENCY)
Facility: HOSPITAL | Age: 34
Discharge: HOME | End: 2024-09-26
Attending: EMERGENCY MEDICINE
Payer: MEDICAID

## 2024-09-26 VITALS
SYSTOLIC BLOOD PRESSURE: 147 MMHG | HEIGHT: 71 IN | TEMPERATURE: 97.5 F | OXYGEN SATURATION: 95 % | HEART RATE: 87 BPM | DIASTOLIC BLOOD PRESSURE: 76 MMHG | BODY MASS INDEX: 44.1 KG/M2 | RESPIRATION RATE: 25 BRPM | WEIGHT: 315 LBS

## 2024-09-26 DIAGNOSIS — R07.9 CHEST PAIN, UNSPECIFIED TYPE: Primary | ICD-10-CM

## 2024-09-26 DIAGNOSIS — D86.9 SARCOIDOSIS: ICD-10-CM

## 2024-09-26 DIAGNOSIS — R04.0 EPISTAXIS: ICD-10-CM

## 2024-09-26 LAB
ALBUMIN SERPL-MCNC: 2.9 G/DL (ref 3.5–5)
ALBUMIN/GLOB SERPL: 0.5 (ref 1.1–2.2)
ALP SERPL-CCNC: 96 U/L (ref 45–117)
ALT SERPL-CCNC: 38 U/L (ref 12–78)
ANION GAP SERPL CALC-SCNC: 6 MMOL/L (ref 2–12)
AST SERPL-CCNC: 35 U/L (ref 15–37)
BASOPHILS # BLD: 0 K/UL (ref 0–0.1)
BASOPHILS NFR BLD: 1 % (ref 0–1)
BILIRUB SERPL-MCNC: 0.3 MG/DL (ref 0.2–1)
BUN SERPL-MCNC: 12 MG/DL (ref 6–20)
BUN/CREAT SERPL: 14 (ref 12–20)
CALCIUM SERPL-MCNC: 8.9 MG/DL (ref 8.5–10.1)
CHLORIDE SERPL-SCNC: 102 MMOL/L (ref 97–108)
CO2 SERPL-SCNC: 29 MMOL/L (ref 21–32)
CREAT SERPL-MCNC: 0.88 MG/DL (ref 0.7–1.3)
DIFFERENTIAL METHOD BLD: ABNORMAL
EOSINOPHIL # BLD: 0.2 K/UL (ref 0–0.4)
EOSINOPHIL NFR BLD: 3 % (ref 0–7)
ERYTHROCYTE [DISTWIDTH] IN BLOOD BY AUTOMATED COUNT: 16.8 % (ref 11.5–14.5)
GLOBULIN SER CALC-MCNC: 5.4 G/DL (ref 2–4)
GLUCOSE SERPL-MCNC: 118 MG/DL (ref 65–100)
HCT VFR BLD AUTO: 33.9 % (ref 36.6–50.3)
HGB BLD-MCNC: 10.3 G/DL (ref 12.1–17)
IMM GRANULOCYTES # BLD AUTO: 0 K/UL (ref 0–0.04)
IMM GRANULOCYTES NFR BLD AUTO: 0 % (ref 0–0.5)
LYMPHOCYTES # BLD: 0.9 K/UL (ref 0.8–3.5)
LYMPHOCYTES NFR BLD: 16 % (ref 12–49)
MCH RBC QN AUTO: 25 PG (ref 26–34)
MCHC RBC AUTO-ENTMCNC: 30.4 G/DL (ref 30–36.5)
MCV RBC AUTO: 82.3 FL (ref 80–99)
MONOCYTES # BLD: 0.5 K/UL (ref 0–1)
MONOCYTES NFR BLD: 9 % (ref 5–13)
NEUTS SEG # BLD: 3.9 K/UL (ref 1.8–8)
NEUTS SEG NFR BLD: 71 % (ref 32–75)
NRBC # BLD: 0 K/UL (ref 0–0.01)
NRBC BLD-RTO: 0 PER 100 WBC
PLATELET # BLD AUTO: 298 K/UL (ref 150–400)
PMV BLD AUTO: 9.6 FL (ref 8.9–12.9)
POTASSIUM SERPL-SCNC: 3.7 MMOL/L (ref 3.5–5.1)
PROT SERPL-MCNC: 8.3 G/DL (ref 6.4–8.2)
RBC # BLD AUTO: 4.12 M/UL (ref 4.1–5.7)
SODIUM SERPL-SCNC: 137 MMOL/L (ref 136–145)
TROPONIN I SERPL HS-MCNC: 10 NG/L (ref 0–76)
TROPONIN I SERPL HS-MCNC: 11 NG/L (ref 0–76)
WBC # BLD AUTO: 5.4 K/UL (ref 4.1–11.1)

## 2024-09-26 PROCEDURE — 84484 ASSAY OF TROPONIN QUANT: CPT

## 2024-09-26 PROCEDURE — 85025 COMPLETE CBC W/AUTO DIFF WBC: CPT

## 2024-09-26 PROCEDURE — 36415 COLL VENOUS BLD VENIPUNCTURE: CPT

## 2024-09-26 PROCEDURE — 80053 COMPREHEN METABOLIC PANEL: CPT

## 2024-09-26 PROCEDURE — 71046 X-RAY EXAM CHEST 2 VIEWS: CPT

## 2024-09-26 PROCEDURE — 99285 EMERGENCY DEPT VISIT HI MDM: CPT

## 2024-09-26 PROCEDURE — 93005 ELECTROCARDIOGRAM TRACING: CPT | Performed by: EMERGENCY MEDICINE

## 2024-09-26 ASSESSMENT — HEART SCORE
ECG: NORMAL
ECG: NORMAL

## 2024-09-26 ASSESSMENT — PAIN SCALES - GENERAL: PAINLEVEL_OUTOF10: 6

## 2024-09-26 ASSESSMENT — PAIN DESCRIPTION - LOCATION: LOCATION: CHEST

## 2024-09-27 LAB
EKG ATRIAL RATE: 81 BPM
EKG ATRIAL RATE: 86 BPM
EKG DIAGNOSIS: NORMAL
EKG DIAGNOSIS: NORMAL
EKG P AXIS: 38 DEGREES
EKG P AXIS: 70 DEGREES
EKG P-R INTERVAL: 180 MS
EKG P-R INTERVAL: 198 MS
EKG Q-T INTERVAL: 366 MS
EKG Q-T INTERVAL: 376 MS
EKG QRS DURATION: 74 MS
EKG QRS DURATION: 86 MS
EKG QTC CALCULATION (BAZETT): 436 MS
EKG QTC CALCULATION (BAZETT): 437 MS
EKG R AXIS: 41 DEGREES
EKG R AXIS: 46 DEGREES
EKG T AXIS: 56 DEGREES
EKG T AXIS: 58 DEGREES
EKG VENTRICULAR RATE: 81 BPM
EKG VENTRICULAR RATE: 86 BPM

## 2025-09-04 ENCOUNTER — HOSPITAL ENCOUNTER (EMERGENCY)
Facility: HOSPITAL | Age: 35
Discharge: HOME | End: 2025-09-04

## 2025-09-04 ENCOUNTER — APPOINTMENT (OUTPATIENT)
Facility: HOSPITAL | Age: 35
End: 2025-09-04

## 2025-09-04 VITALS
DIASTOLIC BLOOD PRESSURE: 60 MMHG | BODY MASS INDEX: 56.45 KG/M2 | WEIGHT: 315 LBS | RESPIRATION RATE: 20 BRPM | HEART RATE: 70 BPM | SYSTOLIC BLOOD PRESSURE: 124 MMHG | TEMPERATURE: 97.5 F | OXYGEN SATURATION: 96 %

## 2025-09-04 DIAGNOSIS — R07.9 ACUTE CHEST PAIN: ICD-10-CM

## 2025-09-04 DIAGNOSIS — J98.01 ACUTE BRONCHOSPASM: Primary | ICD-10-CM

## 2025-09-04 DIAGNOSIS — R06.2 WHEEZING: ICD-10-CM

## 2025-09-04 LAB
ALBUMIN SERPL-MCNC: 2.8 G/DL (ref 3.5–5.2)
ALBUMIN/GLOB SERPL: 0.5 (ref 1.1–2.2)
ALP SERPL-CCNC: 83 U/L (ref 40–129)
ALT SERPL-CCNC: 22 U/L (ref 10–50)
ANION GAP SERPL CALC-SCNC: 10 MMOL/L (ref 2–14)
AST SERPL-CCNC: 35 U/L (ref 10–50)
BASOPHILS # BLD: 0.03 K/UL (ref 0–0.1)
BASOPHILS NFR BLD: 0.6 % (ref 0–1)
BILIRUB SERPL-MCNC: 0.4 MG/DL (ref 0–1.2)
BUN SERPL-MCNC: 11 MG/DL (ref 6–20)
BUN/CREAT SERPL: 16 (ref 12–20)
CALCIUM SERPL-MCNC: 9 MG/DL (ref 8.6–10)
CHLORIDE SERPL-SCNC: 102 MMOL/L (ref 98–107)
CO2 SERPL-SCNC: 25 MMOL/L (ref 20–29)
CREAT SERPL-MCNC: 0.69 MG/DL (ref 0.7–1.2)
DIFFERENTIAL METHOD BLD: ABNORMAL
EOSINOPHIL # BLD: 0.14 K/UL (ref 0–0.4)
EOSINOPHIL NFR BLD: 3 % (ref 0–7)
ERYTHROCYTE [DISTWIDTH] IN BLOOD BY AUTOMATED COUNT: 15.8 % (ref 11.5–14.5)
GLOBULIN SER CALC-MCNC: 5.6 G/DL (ref 2–4)
GLUCOSE SERPL-MCNC: 87 MG/DL (ref 65–100)
HCT VFR BLD AUTO: 34 % (ref 36.6–50.3)
HGB BLD-MCNC: 10.1 G/DL (ref 12.1–17)
IMM GRANULOCYTES # BLD AUTO: 0.02 K/UL (ref 0–0.04)
IMM GRANULOCYTES NFR BLD AUTO: 0.4 % (ref 0–0.5)
LYMPHOCYTES # BLD: 1.08 K/UL (ref 0.8–3.5)
LYMPHOCYTES NFR BLD: 23 % (ref 12–49)
MCH RBC QN AUTO: 25.1 PG (ref 26–34)
MCHC RBC AUTO-ENTMCNC: 29.7 G/DL (ref 30–36.5)
MCV RBC AUTO: 84.4 FL (ref 80–99)
MONOCYTES # BLD: 0.63 K/UL (ref 0–1)
MONOCYTES NFR BLD: 13.4 % (ref 5–13)
NEUTS SEG # BLD: 2.8 K/UL (ref 1.8–8)
NEUTS SEG NFR BLD: 59.6 % (ref 32–75)
NRBC # BLD: 0 K/UL (ref 0–0.01)
NRBC BLD-RTO: 0 PER 100 WBC
NT PRO BNP: 126 PG/ML (ref 0–125)
PLATELET # BLD AUTO: 279 K/UL (ref 150–400)
PMV BLD AUTO: 10.2 FL (ref 8.9–12.9)
POTASSIUM SERPL-SCNC: 4.2 MMOL/L (ref 3.5–5.1)
PROT SERPL-MCNC: 8.3 G/DL (ref 6.4–8.3)
RBC # BLD AUTO: 4.03 M/UL (ref 4.1–5.7)
SODIUM SERPL-SCNC: 137 MMOL/L (ref 136–145)
TROPONIN T SERPL HS-MCNC: 11.3 NG/L (ref 0–22)
WBC # BLD AUTO: 4.7 K/UL (ref 4.1–11.1)

## 2025-09-04 PROCEDURE — 84484 ASSAY OF TROPONIN QUANT: CPT

## 2025-09-04 PROCEDURE — 36415 COLL VENOUS BLD VENIPUNCTURE: CPT

## 2025-09-04 PROCEDURE — 80053 COMPREHEN METABOLIC PANEL: CPT

## 2025-09-04 PROCEDURE — 85025 COMPLETE CBC W/AUTO DIFF WBC: CPT

## 2025-09-04 PROCEDURE — 71045 X-RAY EXAM CHEST 1 VIEW: CPT

## 2025-09-04 PROCEDURE — 83880 ASSAY OF NATRIURETIC PEPTIDE: CPT

## 2025-09-04 RX ORDER — PREDNISONE 10 MG/1
TABLET ORAL
Qty: 1 EACH | Refills: 0 | Status: SHIPPED | OUTPATIENT
Start: 2025-09-04

## 2025-09-04 RX ORDER — ALBUTEROL SULFATE 90 UG/1
2 INHALANT RESPIRATORY (INHALATION) 4 TIMES DAILY PRN
Qty: 54 G | Refills: 1 | Status: SHIPPED | OUTPATIENT
Start: 2025-09-04

## 2025-09-04 ASSESSMENT — PAIN SCALES - GENERAL
PAINLEVEL_OUTOF10: 0
PAINLEVEL_OUTOF10: 0

## 2025-09-04 ASSESSMENT — HEART SCORE: ECG: NORMAL

## 2025-09-07 LAB
EKG ATRIAL RATE: 79 BPM
EKG DIAGNOSIS: NORMAL
EKG P AXIS: 70 DEGREES
EKG P-R INTERVAL: 196 MS
EKG Q-T INTERVAL: 388 MS
EKG QRS DURATION: 80 MS
EKG QTC CALCULATION (BAZETT): 444 MS
EKG R AXIS: 65 DEGREES
EKG T AXIS: 61 DEGREES
EKG VENTRICULAR RATE: 79 BPM

## (undated) DEVICE — PROVE COVER: Brand: UNBRANDED

## (undated) DEVICE — HEART CATH-MRMC: Brand: MEDLINE INDUSTRIES, INC.

## (undated) DEVICE — GLIDESHEATH SLENDER ACCESS KIT: Brand: GLIDESHEATH SLENDER

## (undated) DEVICE — GUIDEWIRE VASC L40CM DIA0018IN NDL 21GA L7CM Z S STL MAK

## (undated) DEVICE — RADIFOCUS OPTITORQUE ANGIOGRAPHIC CATHETER: Brand: OPTITORQUE

## (undated) DEVICE — SPLINT WR VELC FOAM NEUT POS DISP FOR RAD ART ACC SFT STRP

## (undated) DEVICE — DRAPE,ANGIO,BRACH,STERILE,38X44: Brand: MEDLINE

## (undated) DEVICE — CATHETER PRESSURE WEDGE BLLN 6FRX110CM

## (undated) DEVICE — CATHETER COR DIAG PIGTAILS PIG 145 CRV 5FR 110CM 6 SIDE H

## (undated) DEVICE — TUBING PRSS MON L6IN PVC M FEM CONN

## (undated) DEVICE — SYRINGE ANGIO 10 CC BRL STD PRNT POLYCARB LT BLU MEDALLION

## (undated) DEVICE — 3M™ TEGADERM™ TRANSPARENT FILM DRESSING FRAME STYLE, 1626W, 4 IN X 4-3/4 IN (10 CM X 12 CM), 50/CT 4CT/CASE: Brand: 3M™ TEGADERM™

## (undated) DEVICE — TR BAND RADIAL ARTERY COMPRESSION DEVICE: Brand: TR BAND

## (undated) DEVICE — Device: Brand: PROWATER

## (undated) DEVICE — CATHETER DIAG 5FR L100CM LUMN ID0.047IN JL4 CRV 0 SIDE H

## (undated) DEVICE — KIT ACCS INTRO 4FR L10CM NDL 21GA L7CM GWIRE L40CM

## (undated) DEVICE — HI-TORQUE VERSACORE FLOPPY GUIDE WIRE SYSTEM 145 CM: Brand: HI-TORQUE VERSACORE

## (undated) DEVICE — GUIDEWIRE VASC L260CM 0.035IN J TIP L3MM PTFE FIX COR NAMIC